# Patient Record
Sex: MALE | Race: WHITE | HISPANIC OR LATINO | Employment: OTHER | URBAN - METROPOLITAN AREA
[De-identification: names, ages, dates, MRNs, and addresses within clinical notes are randomized per-mention and may not be internally consistent; named-entity substitution may affect disease eponyms.]

---

## 2018-01-13 NOTE — RESULT NOTES
Message   Please inform patient that the biopsies confirmed infection with H  pylori  I will prescribe her antibiotics  I am still awaiting the results of his CAT scan  Please have him schedule a two-month follow-up, sooner if needed  Verified Results  (1) TISSUE EXAM [id number] 09:40AM Viola Hardin     Test Name Result Flag Reference   LAB AP CASE REPORT (Report)     Surgical Pathology Report             Case: [id number]                   Authorizing Provider: Mikayla Reeves MD      Collected:      02/29/2016 0940        Ordering Location:   [facility]  Received:      02/29/2016 [address]                                     Pathologist:      Gordon Duckworth DO                               Specimen:  Stomach, gastric body biopsy r/o gastritis   LAB AP FINAL DIAGNOSIS      A  Stomach, body, biopsy:  - 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, moderate  - Many H  pylori organisms identified on H&E and immunohistochemical   stains  LAB AP NOTE      Appropriate positive and negative controls obtained  LAB AP SURGICAL ADDITIONAL INFORMATION (Report)     These tests were developed and their performance characteristics   determined by [address] [place] [address] or [employer]  They may not be cleared or approved by the [place]  Food and   Drug Administration  The FDA has determined that such clearance or   approval is not necessary  These tests are used for clinical purposes  They should not be regarded as investigational or for research  This   laboratory has been approved by CLIA 88, designated as a high-complexity   laboratory and is qualified to perform these tests  LAB AP GROSS DESCRIPTION (Report)     A  The specimen is received in formalin, labeled with the patient's name   and hospital number, and is designated gastric body biopsy   The   specimen consists of multiple tan-pink soft tissue fragments measuring in   aggregate 0 8 by 0 8 by 0 2 centimeters  Entirely submitted  One cassette  Note: The estimated total formalin fixation time based upon information   provided by the submitting clinician and the standard processing schedule   is 18 75 hours  MAS       Discussion/Summary   Biopsies from your recent upper endoscopy showed infection with H  pylori bacteria  This may explain your symptoms  Please take antibiotics as prescribed  Please complete the CAT scan as requested       Signatures   Electronically signed by : JAVIER Arnold ; Mar  6 2016 10:29PM EST                       (Author)

## 2018-01-15 NOTE — CONSULTS
Assessment    1  Abdominal pain, acute, left upper quadrant (789 02,338 19) (R10 12)    Plan  Abdominal pain, acute, left upper quadrant    · Omeprazole 40 MG Oral Capsule Delayed Release; TAKE 1 CAPSULE DAILY   Rx By: Kiki Goldsmith; Dispense: 30 Days ; #:1 Capsule Delayed Release; Refill: 11; For: Abdominal pain, acute, left upper quadrant; AMBIKA = N; Verified Transmission to [facility] [facility]; Last Updated By: [employer]; 2/2/2016 2:08:39 PM   · EGD; Status:Hold For - Scheduling; Requested for:02Feb2016;    Perform:[facility]; TVV:[id number];OSUOMUC; For:Abdominal pain, acute, left upper quadrant; Ordered By:Nate Sanders;   · US ABDOMEN LIMITED; Status:Hold For - Scheduling; Requested for:02Feb2016;    Perform:[facility]; PII:[id number];UREENZZ; For:Abdominal pain, acute, left upper quadrant; Ordered By:Nate Sanders;    Discussion/Summary  Discussion Summary: This is a pleasant 65-year-old gentleman presenting with increasing episodes of abdominal pain, bloating, night sweats  #1 left upper quadrant abdominal pain/bloating: Possibly due to IBS in the setting of his stressors, versus peptic ulcer disease versus inflammatory bowel disease   -We'll proceed with an upper endoscopy to evaluate further  -Discuss with him the risks of the procedure including bleeding, surgery, perforation  -We'll prescribe him daily PPI therapy    #2 history of pancreatitis: In light of his current symptoms we will evaluate for any biliary etiology with a right upper quadrant ultrasound  Would consider further evaluation with laboratory studies and CAT scan imaging if he persists with such symptoms        Chief Complaint  Chief Complaint Free Text Note Form: Evaluation of abdominal pain      History of Present Illness  HPI: This is a 65-year-old gentleman who reports he has a remote history of pancreatitis, and recently has had increasing episodes of epigastric and left upper quadrant pain which radiates around his back  He was admitted approximately 4 years ago for similar episode, though no further workup was noted at that time  The patient denies any alcohol  He denies any tobacco, he works in [address]  He notes that he has had some postprandial abdominal pain and discomfort, but he denies any nausea, vomiting, change in appetite, change in weight, diarrhea, constipation  His most significant symptoms include daily bloating and more recently he's had frequent night sweats as well  He attributes a lot of his symptoms to his stressors related to his ex-wife  The patient denies significant NSAIDs on a regular basis  He denies any alcohol  Denies any prior endoscopic evaluation  Review of Systems  Complete-Male GI Adult: Other Symptoms: The remainder of the ten ROS was negative  ROS Reviewed:   ROS reviewed  Active Problems    1  Need for Tdap vaccination (V06 1) (Z23)   2  Nicotine dependence (305 1) (F17 200)   3  Night sweats (780 8) (R61)   4  Preop examination (V72 84) (Z01 818)   5  Rib pain on right side (786 50) (R07 81)   6  Tendonitis (726 90) (M77 9)   7  Thyroid disorder screen (V77 0) (Z13 29)    Past Medical History    1  History of Cough (786 2) (R05)   2  History of Generalized abdominal pain (789 07) (R10 84)   3  History of acute pharyngitis (V12 69) (Z87 09)   4  History of acute sinusitis (V12 69) (Z87 09)   5  History of herpes simplex infection (V12 09) (Z86 19)   6  History of Sprain Of The Back (847 9)  Active Problems And Past Medical History Reviewed: The active problems and past medical history were reviewed and updated today  Surgical History    1  History of Appendectomy  Surgical History Reviewed: The surgical history was reviewed and updated today  Family History    1  Denied: Family history of Crohn's disease   2  Denied: Family history of liver disease   3   Denied: Family history of Malignant neoplasm of colon, unspecified part of colon  Family History Reviewed: The family history was reviewed and updated today  Social History    · Denied: History of Alcohol use   · Current Every Day Smoker (305 1)   · Never a smoker  Social History Reviewed: The social history was reviewed and updated today  Current Meds   1  Ibuprofen 600 MG Oral Tablet; TAKE 1 TABLET 3 TIMES DAILY WITH FOOD AS NEEDED; Therapy: [id number] to (Evaluate:14Mar2015); Last Rx:[id number] Ordered   2  Naproxen 500 MG Oral Tablet; TAKE 1 TABLET EVERY 12 HOURS WITH FOOD; Therapy: 83UHS6405 to (Evaluate:12Mar2014); Last Rx:[id number] Ordered   3  PredniSONE 20 MG Oral Tablet; TAKE 1 TABLET TWICE DAILY; Therapy: 86IQS7104 to (Celien Estrella)  Requested for: 14TKF7887; Last   Rx:[id number] Ordered  Medication List Reviewed: The medication list was reviewed and updated today  Allergies    1  No Known Drug Allergies    2  No Known Latex Allergies    Vitals  Vital Signs [Data Includes: Current Encounter]    Recorded: 04WXB8829 01:49PM   Heart Rate 78, R Radial   Pulse Quality Normal, R Radial   Respiration 18   Respiration Quality Normal   Systolic 495, RUE, Sitting   Diastolic 70, RUE, Sitting   Height 6 ft 2 in   Weight 215 lb 0 6 oz   BMI Calculated 27 61   BSA Calculated 2 24   O2 Saturation 98     Physical Exam   Gen  : Well-nourished well-developed, no acute distress  HEENT: Anicteric, moist mucous membranes, no cervical or supraclavicular lymphadenopathy  Cardiovascular: Regular rate and rhythm, no murmurs rubs or gallops  Pulmonary: Clear to auscultation bilaterally  Abdomen: Soft, mild left upper quadrant tenderness, no rebound, no guarding, nondistended  No hepatosplenomegaly  Bowel sounds present and regular  Extremities: No cyanosis, clubbing, or edema,  Skin: No rashes  Neuro: Alert, awake, oriented x3         Future Appointments    Date/Time Provider Specialty Site   03/07/2016 12:45 PM JAVIER Tolbert   Gastroenterology Adult [facility] [id number] Signatures   Electronically signed by : JAVIER Aquino ; Feb 2 2016  9:59PM EST                       (Author)

## 2018-11-21 ENCOUNTER — HOSPITAL ENCOUNTER (EMERGENCY)
Facility: HOSPITAL | Age: 48
Discharge: HOME | End: 2018-11-21
Attending: EMERGENCY MEDICINE | Admitting: EMERGENCY MEDICINE
Payer: COMMERCIAL

## 2018-11-21 VITALS
WEIGHT: 220 LBS | SYSTOLIC BLOOD PRESSURE: 136 MMHG | DIASTOLIC BLOOD PRESSURE: 76 MMHG | OXYGEN SATURATION: 97 % | BODY MASS INDEX: 28.25 KG/M2 | TEMPERATURE: 98.4 F | HEART RATE: 82 BPM | RESPIRATION RATE: 16 BRPM

## 2018-11-21 DIAGNOSIS — S61.419A HAND LACERATION: Primary | ICD-10-CM

## 2018-11-21 PROCEDURE — 99282 EMERGENCY DEPT VISIT SF MDM: CPT

## 2018-11-21 PROCEDURE — 90471 IMMUNIZATION ADMIN: CPT

## 2018-11-21 PROCEDURE — 90715 TDAP VACCINE 7 YRS/> IM: CPT | Performed by: PHYSICIAN ASSISTANT

## 2018-11-21 RX ORDER — GINSENG 100 MG
1 CAPSULE ORAL ONCE
Status: COMPLETED | OUTPATIENT
Start: 2018-11-21 | End: 2018-11-21

## 2018-11-21 RX ADMIN — TETANUS TOXOID, REDUCED DIPHTHERIA TOXOID AND ACELLULAR PERTUSSIS VACCINE, ADSORBED 0.5 ML: 5; 2.5; 8; 8; 2.5 SUSPENSION INTRAMUSCULAR at 15:05

## 2018-11-21 RX ADMIN — BACITRACIN ZINC 1 SMALL APPLICATION: 500 OINTMENT TOPICAL at 15:05

## 2018-11-21 NOTE — ED PROVIDER NOTES
History  Chief Complaint   Patient presents with    Laceration     has laceration on left hand thumb side     50-year-old male presents with left hand laceration x30 minutes  The laceration is 1 cm and superficial  He was working on a home project when he cut his hand with a knife  It is not bleeding now  He denies any numbness or tingling  He still has full range of motion of his fingers  He is due for his next tetanus shot this year  He denies any wrist pain  No swelling, bruising, or redness  Prior to Admission Medications   Prescriptions Last Dose Informant Patient Reported? Taking?   omeprazole (PriLOSEC) 40 MG capsule Not Taking at Unknown time Self Yes No   Sig: Take 40 mg by mouth daily Indications: Gastroesophageal Reflux Disease  Facility-Administered Medications: None       Past Medical History:   Diagnosis Date    Pancreatitis 2012    Hospitalized for Pancreatitis    Reflux gastritis        Past Surgical History:   Procedure Laterality Date    APPENDECTOMY      ESOPHAGOGASTRODUODENOSCOPY N/A 2/29/2016    Procedure: ESOPHAGOGASTRODUODENOSCOPY (EGD); Surgeon: Daniel Sims MD;  Location: [facility]; Service:        History reviewed  No pertinent family history  I have reviewed and agree with the history as documented  Social History   Substance Use Topics    Smoking status: Never Smoker    Smokeless tobacco: Never Used    Alcohol use No      Comment: quit 20 yrs  ago        Review of Systems   Constitutional: Negative for chills and fever  HENT: Negative for sneezing and sore throat  Respiratory: Negative for cough and shortness of breath  Cardiovascular: Negative for chest pain, palpitations and leg swelling  Gastrointestinal: Negative for abdominal pain, constipation, diarrhea and vomiting  Musculoskeletal: Negative for back pain, gait problem, joint swelling and myalgias  Skin: Positive for wound  Negative for color change, pallor and rash  Neurological: Negative for dizziness, syncope, weakness, light-headedness, numbness and headaches  All other systems reviewed and are negative  Physical Exam  Physical Exam   Constitutional: He appears well-developed and well-nourished  No distress  HENT:   Head: Normocephalic and atraumatic  Nose: Nose normal    Eyes: EOM are normal    Neck: Normal range of motion  Neck supple  Cardiovascular: Normal rate, regular rhythm, normal heart sounds and intact distal pulses  Exam reveals no gallop and no friction rub  No murmur heard  Pulmonary/Chest: Effort normal and breath sounds normal  No respiratory distress  He has no wheezes  He has no rales  Sp02 is 97% indicating adequate oxygenation on room air   Musculoskeletal:        Hands:  Skin: Skin is warm and dry  Capillary refill takes less than 2 seconds  No rash noted  He is not diaphoretic  No erythema  No pallor  Nursing note and vitals reviewed  Vital Signs  ED Triage Vitals [11/21/18 1451]   Temperature Pulse Respirations Blood Pressure SpO2   98 4 °F (36 9 °C) 82 16 136/76 97 %      Temp Source Heart Rate Source Patient Position - Orthostatic VS BP Location FiO2 (%)   Tympanic Monitor Sitting Right arm --      Pain Score       2           Vitals:    11/21/18 1451 11/21/18 1500   BP: 136/76 136/76   Pulse: 82    Patient Position - Orthostatic VS: Sitting        Visual Acuity      ED Medications  Medications   bacitracin topical ointment 1 small application (1 small application Topical Given 11/21/18 1505)   tetanus-diphtheria-acellular pertussis (BOOSTRIX) IM injection 0 5 mL (0 5 mL Intramuscular Given 11/21/18 1505)       Diagnostic Studies  Results Reviewed     None                 No orders to display              Procedures  Lac Repair  Date/Time: 11/21/2018 3:20 PM  Performed by: Tamir Ziegler by: [facility]   Consent: Verbal consent obtained    Risks and benefits: risks, benefits and alternatives were discussed  Consent given by: patient  Patient understanding: patient states understanding of the procedure being performed  Patient consent: the patient's understanding of the procedure matches consent given  Procedure consent: procedure consent matches procedure scheduled  Relevant documents: relevant documents present and verified  Test results: test results available and properly labeled  Site marked: the operative site was marked  Radiology Images displayed and confirmed  If images not available, report reviewed: imaging studies available  Required items: required blood products, implants, devices, and special equipment available  Patient identity confirmed: verbally with patient  Time out: Immediately prior to procedure a "time out" was called to verify the correct patient, procedure, equipment, support staff and site/side marked as required  Body area: upper extremity  Location details: left hand  Laceration length: 1 cm  Foreign bodies: no foreign bodies  Tendon involvement: none  Nerve involvement: none  Vascular damage: no  Anesthesia: local infiltration    Anesthesia:  Local Anesthetic: lidocaine 1% with epinephrine  Anesthetic total: 2 mL    Wound Dehiscence:  Superficial Wound Dehiscence: simple closure      Procedure Details:  Preparation: Patient was prepped and draped in the usual sterile fashion    Irrigation solution: saline  Irrigation method: syringe  Amount of cleaning: standard  Debridement: none  Degree of undermining: none  Skin closure: Ethilon (4-0)  Number of sutures: 5  Technique: simple  Approximation: close  Approximation difficulty: simple  Dressing: antibiotic ointment, gauze roll and pressure dressing  Patient tolerance: Patient tolerated the procedure well with no immediate complications             Phone Contacts  ED Phone Contact    ED Course                               MDM  Number of Diagnoses or Management Options  Hand laceration:   Diagnosis management comments: Updated patient's tetanus vaccine  Patient tolerated suture placement without complication  Continue to apply bacitracin to site  Return to ED, urgent care, or pcp in 7-10 days for suture removal  Gave patient proper education regarding diagnosis  Answered all questions  Return to ED for any worsening of symptoms otherwise follow up with primary care physician for re-evaluation  Discussed plan with patient who verbalized understanding and agreed to plan  Amount and/or Complexity of Data Reviewed  Discuss the patient with other providers: yes      CritCare Time    Disposition  Final diagnoses:   Hand laceration     Time reflects when diagnosis was documented in both MDM as applicable and the Disposition within this note     Time User Action Codes Description Comment    11/21/2018  3:34 PM So Sheldon Add [D21 263T] Hand laceration       ED Disposition     ED Disposition Condition Comment    Discharge  Megan Melania discharge to home/self care  Condition at discharge: Good        Follow-up Information     Follow up With Specialties Details Why Contact Info Additional Information    Antione Mcmullen MD Family Medicine Schedule an appointment as soon as possible for a visit in 1 week For suture removal in 7-10 days [address] [facility] Emergency Department Emergency Medicine Go in 1 week For suture removal in 7-10 days [address] [facility], [facility], [address]          Discharge Medication List as of 11/21/2018  3:34 PM      CONTINUE these medications which have NOT CHANGED    Details   omeprazole (PriLOSEC) 40 MG capsule Take 40 mg by mouth daily Indications: Gastroesophageal Reflux Disease , Until Discontinued, Historical Med           No discharge procedures on file      ED Provider  Electronically Signed by           Mildred Hernandez PA-C  11/21/18 4942

## 2018-11-21 NOTE — DISCHARGE INSTRUCTIONS
Laceration   WHAT YOU NEED TO KNOW:   A laceration is an injury to the skin and the soft tissue underneath it  Lacerations happen when you are cut or hit by something  They can happen anywhere on the body  DISCHARGE INSTRUCTIONS:   Return to the emergency department if:   · You have heavy bleeding or bleeding that does not stop after 10 minutes of holding firm, direct pressure over the wound  · Your wound opens up  Contact your healthcare provider if:   · You have a fever or chills  · Your laceration is red, warm, or swollen  · You have red streaks on your skin coming from your wound  · You have white or yellow drainage from the wound that smells bad  · You have pain that gets worse, even after treatment  · You have questions or concerns about your condition or care  Medicines:   · Prescription pain medicine  may be given  Ask how to take this medicine safely  · Antibiotics  help treat or prevent a bacterial infection  · Take your medicine as directed  Contact your healthcare provider if you think your medicine is not helping or if you have side effects  Tell him or her if you are allergic to any medicine  Keep a list of the medicines, vitamins, and herbs you take  Include the amounts, and when and why you take them  Bring the list or the pill bottles to follow-up visits  Carry your medicine list with you in case of an emergency  Care for your wound as directed:   · Do not get your wound wet  until your healthcare provider says it is okay  Do not soak your wound in water  Do not go swimming until your healthcare provider says it is okay  Carefully wash the wound with soap and water  Gently pat the area dry or allow it to air dry  · Change your bandages  when they get wet, dirty, or after washing  Apply new, clean bandages as directed  Do not apply elastic bandages or tape too tight  Do not put powders or lotions over your incision  · Apply antibiotic ointment as directed  Your healthcare provider may give you antibiotic ointment to put over your wound if you have stitches  If you have strips of tape over your incision, let them dry up and fall off on their own  If they do not fall off within 14 days, gently remove them  If you have glue over your wound, do not remove or pick at it  If your glue comes off, do not replace it with glue that you have at home  · Check your wound every day for signs of infection such as swelling, redness, or pus  Self-care:   · Apply ice  on your wound for 15 to 20 minutes every hour or as directed  Use an ice pack, or put crushed ice in a plastic bag  Cover it with a towel  Ice helps prevent tissue damage and decreases swelling and pain  · Use a splint as directed  A splint will decrease movement and stress on your wound  It may help it heal faster  A splint may be used for lacerations over joints or areas of your body that bend  Ask your healthcare provider how to apply and remove a splint  · Decrease scarring of your wound  by applying ointments as directed  Do not apply ointments until your healthcare provider says it is okay  You may need to wait until your wound is healed  Ask which ointment to buy and how often to use it  After your wound is healed, use sunscreen over the area when you are out in the sun  You should do this for at least 6 months to 1 year after your injury  Follow up with your healthcare provider as directed: You may need to follow up in 24 to 48 hours to have your wound checked for infection  You will need to return in 3 to 14 days if you have stitches or staples so they can be removed  Care for your wound as directed to prevent infection and help it heal  Write down your questions so you remember to ask them during your visits  © 2017 [employer] Information is for End User's use only and may not be sold, redistributed or otherwise used for commercial purposes   All illustrations and images included in [facility] are the copyrighted property of [employer]  or Alvaro Sanchez  The above information is an  only  It is not intended as medical advice for individual conditions or treatments  Talk to your doctor, nurse or pharmacist before following any medical regimen to see if it is safe and effective for you

## 2019-07-07 ENCOUNTER — OFFICE VISIT (OUTPATIENT)
Dept: URGENT CARE | Facility: CLINIC | Age: 49
End: 2019-07-07
Payer: COMMERCIAL

## 2019-07-07 VITALS
HEIGHT: 74 IN | SYSTOLIC BLOOD PRESSURE: 135 MMHG | HEART RATE: 68 BPM | TEMPERATURE: 98.8 F | DIASTOLIC BLOOD PRESSURE: 70 MMHG | WEIGHT: 228 LBS | BODY MASS INDEX: 29.26 KG/M2 | RESPIRATION RATE: 16 BRPM | OXYGEN SATURATION: 98 %

## 2019-07-07 DIAGNOSIS — H60.502 ACUTE OTITIS EXTERNA OF LEFT EAR, UNSPECIFIED TYPE: Primary | ICD-10-CM

## 2019-07-07 PROCEDURE — 99213 OFFICE O/P EST LOW 20 MIN: CPT | Performed by: NURSE PRACTITIONER

## 2019-07-07 RX ORDER — OFLOXACIN 3 MG/ML
10 SOLUTION AURICULAR (OTIC) DAILY
Qty: 5 ML | Refills: 0 | Status: SHIPPED | OUTPATIENT
Start: 2019-07-07 | End: 2019-07-14

## 2019-07-07 NOTE — PROGRESS NOTES
[facility]        NAME: Jeffrey Freitas is a 51 y o  male  : 1970    MRN: [id number]  DATE: 2019  TIME: 1:12 PM    Assessment and Plan   Acute otitis externa of left ear, unspecified type [H60 502]  1  Acute otitis externa of left ear, unspecified type  ofloxacin (FLOXIN) 0 3 % otic solution         Patient Instructions     Otitis Externa   WHAT YOU NEED TO KNOW:   Otitis externa, or swimmer's ear, is an infection in the outer ear canal  This canal goes from the outside of the ear to the eardrum  DISCHARGE INSTRUCTIONS:   Return to the emergency department if:   · You have severe ear pain  · You are suddenly unable to hear at all  · You have new swelling in your face, behind your ears, or in your neck  · You suddenly cannot move part of your face  · Your face suddenly feels numb  Contact your healthcare provider if:   · You have a fever  · Your signs and symptoms do not get better after 2 days of treatment  · Your signs and symptoms go away for a time, but then come back  · You have questions or concerns about your condition or care  Medicines:   · NSAIDs , such as ibuprofen, help decrease swelling, pain, and fever  This medicine is available with or without a doctor's order  NSAIDs can cause stomach bleeding or kidney problems in certain people  If you take blood thinner medicine, always ask if NSAIDs are safe for you  Always read the medicine label and follow directions  Do not give these medicines to children under 10months of age without direction from your child's healthcare provider  · Acetaminophen  decreases pain and fever  It is available without a doctor's order  Ask how much to take and how often to take it  Follow directions  Acetaminophen can cause liver damage if not taken correctly  · Ear drops  that contain an antibiotic may be given  The antibiotic helps treat a bacterial infection  You may also be given steroid medicine   The steroid helps decrease redness, swelling, and pain  · Take your medicine as directed  Contact your healthcare provider if you think your medicine is not helping or if you have side effects  Tell him or her if you are allergic to any medicine  Keep a list of the medicines, vitamins, and herbs you take  Include the amounts, and when and why you take them  Bring the list or the pill bottles to follow-up visits  Carry your medicine list with you in case of an emergency  Follow up with your healthcare provider as directed:  Write down your questions so you remember to ask them during your visits  How to use eardrops:   · Lie down on your side with your infected ear facing up  · Carefully drip the correct number of eardrops into your ear  Have another person help you if possible  · Gently move the outside part of your ear back and forth to help the medicine reach your ear canal      · Stay lying down in the same position (with your ear facing up) for 3 to 5 minutes  Prevent otitis externa:   · Do not put cotton swabs or foreign objects in your ears  · Wrap a clean moist washcloth around your finger, and use it to clean your outer ear and remove extra ear wax  · Use ear plugs when you swim  Dry your outer ears completely after you swim or bathe  © 2017 [employer]  Information is for End User's use only and may not be sold, redistributed or otherwise used for commercial purposes  All illustrations and images included in CareNotes® are the copyrighted property of [employer]  or [employer]  The above information is an  only  It is not intended as medical advice for individual conditions or treatments  Talk to your doctor, nurse or pharmacist before following any medical regimen to see if it is safe and effective for you          Chief Complaint     Chief Complaint   Patient presents with    Ear Fullness     Experiencing pressure/ drainage in L ear 2 days, slowly starting to feel pressure in R ear today  History of Present Illness       52 y/o male presents for left ear pressure/pain and bloody drainage  He states he recently returned from [facility]  He was swimming in the pool and ocean frequently  He also states he got a spa treatment that involved different types of heated and cooled water treatments  He is unsure if the ear is related to the spa, but feels that it could have caused the pain and drainage  He denies and change in hearing, headache, sore throat, congestion, N/V, or cough  The pain is localized to the ear, it does not extend into the neck  He denies using q-tips or any foreign bodies in th ear  He has not tried any OTC tylenol/ibuprofen for the pain  Review of Systems   Review of Systems   Constitutional: Negative for chills and fever  HENT: Positive for ear discharge and ear pain  Respiratory: Negative for cough  Cardiovascular: Negative for chest pain and palpitations  Gastrointestinal: Negative for nausea and vomiting  Current Medications       Current Outpatient Medications:     ofloxacin (FLOXIN) 0 3 % otic solution, Administer 10 drops into the left ear daily for 7 days, Disp: 5 mL, Rfl: 0    Current Allergies     Allergies as of 07/07/2019    (No Known Allergies)            The following portions of the patient's history were reviewed and updated as appropriate: allergies, current medications, past family history, past medical history, past social history, past surgical history and problem list      Past Medical History:   Diagnosis Date    Pancreatitis 2012    Hospitalized for Pancreatitis    Reflux gastritis        Past Surgical History:   Procedure Laterality Date    APPENDECTOMY      ESOPHAGOGASTRODUODENOSCOPY N/A 2/29/2016    Procedure: ESOPHAGOGASTRODUODENOSCOPY (EGD); Surgeon: Homero Denson MD;  Location: [facility]; Service:        History reviewed  No pertinent family history        Medications have been verified  Objective   /70 (BP Location: Left arm, Patient Position: Sitting, Cuff Size: Standard)   Pulse 68   Temp 98 8 °F (37 1 °C) (Tympanic)   Resp 16   Ht 6' 2" (1 88 m)   Wt 103 kg (228 lb)   SpO2 98%   BMI 29 27 kg/m²        Physical Exam     Physical Exam   Constitutional: Vital signs are normal  He appears well-developed and well-nourished  No distress  HENT:   Right Ear: Tympanic membrane, external ear and ear canal normal    Left Ear: Tympanic membrane and external ear normal  There is drainage, swelling and tenderness  The left ear canal is tender during examination, there is purulent drainage present  At the 6 o'clock position there is an excoriation with scant amount of bloody drainage  TM is intact  There is no pain with palpation to the external ear  Cardiovascular: Normal rate, regular rhythm and normal heart sounds     Pulmonary/Chest: Effort normal and breath sounds normal

## 2019-07-07 NOTE — PATIENT INSTRUCTIONS
Otitis Externa   WHAT YOU NEED TO KNOW:   Otitis externa, or swimmer's ear, is an infection in the outer ear canal  This canal goes from the outside of the ear to the eardrum  DISCHARGE INSTRUCTIONS:   Return to the emergency department if:   · You have severe ear pain  · You are suddenly unable to hear at all  · You have new swelling in your face, behind your ears, or in your neck  · You suddenly cannot move part of your face  · Your face suddenly feels numb  Contact your healthcare provider if:   · You have a fever  · Your signs and symptoms do not get better after 2 days of treatment  · Your signs and symptoms go away for a time, but then come back  · You have questions or concerns about your condition or care  Medicines:   · NSAIDs , such as ibuprofen, help decrease swelling, pain, and fever  This medicine is available with or without a doctor's order  NSAIDs can cause stomach bleeding or kidney problems in certain people  If you take blood thinner medicine, always ask if NSAIDs are safe for you  Always read the medicine label and follow directions  Do not give these medicines to children under 10months of age without direction from your child's healthcare provider  · Acetaminophen  decreases pain and fever  It is available without a doctor's order  Ask how much to take and how often to take it  Follow directions  Acetaminophen can cause liver damage if not taken correctly  · Ear drops  that contain an antibiotic may be given  The antibiotic helps treat a bacterial infection  You may also be given steroid medicine  The steroid helps decrease redness, swelling, and pain  · Take your medicine as directed  Contact your healthcare provider if you think your medicine is not helping or if you have side effects  Tell him or her if you are allergic to any medicine  Keep a list of the medicines, vitamins, and herbs you take  Include the amounts, and when and why you take them  Bring the list or the pill bottles to follow-up visits  Carry your medicine list with you in case of an emergency  Follow up with your healthcare provider as directed:  Write down your questions so you remember to ask them during your visits  How to use eardrops:   · Lie down on your side with your infected ear facing up  · Carefully drip the correct number of eardrops into your ear  Have another person help you if possible  · Gently move the outside part of your ear back and forth to help the medicine reach your ear canal      · Stay lying down in the same position (with your ear facing up) for 3 to 5 minutes  Prevent otitis externa:   · Do not put cotton swabs or foreign objects in your ears  · Wrap a clean moist washcloth around your finger, and use it to clean your outer ear and remove extra ear wax  · Use ear plugs when you swim  Dry your outer ears completely after you swim or bathe  © 2017 [employer]  Information is for End User's use only and may not be sold, redistributed or otherwise used for commercial purposes  All illustrations and images included in CareNotes® are the copyrighted property of [employer]  or [employer]  The above information is an  only  It is not intended as medical advice for individual conditions or treatments  Talk to your doctor, nurse or pharmacist before following any medical regimen to see if it is safe and effective for you

## 2020-01-07 ENCOUNTER — OFFICE VISIT (OUTPATIENT)
Dept: URGENT CARE | Facility: CLINIC | Age: 50
End: 2020-01-07
Payer: COMMERCIAL

## 2020-01-07 VITALS
BODY MASS INDEX: 29.13 KG/M2 | DIASTOLIC BLOOD PRESSURE: 76 MMHG | WEIGHT: 227 LBS | OXYGEN SATURATION: 97 % | TEMPERATURE: 98.4 F | HEIGHT: 74 IN | RESPIRATION RATE: 20 BRPM | HEART RATE: 79 BPM | SYSTOLIC BLOOD PRESSURE: 127 MMHG

## 2020-01-07 DIAGNOSIS — J01.10 ACUTE NON-RECURRENT FRONTAL SINUSITIS: ICD-10-CM

## 2020-01-07 DIAGNOSIS — J20.9 ACUTE BRONCHITIS, UNSPECIFIED ORGANISM: Primary | ICD-10-CM

## 2020-01-07 PROCEDURE — 99213 OFFICE O/P EST LOW 20 MIN: CPT | Performed by: PHYSICIAN ASSISTANT

## 2020-01-07 RX ORDER — METHYLPREDNISOLONE 4 MG/1
TABLET ORAL
Qty: 1 EACH | Refills: 0 | Status: SHIPPED | OUTPATIENT
Start: 2020-01-07 | End: 2021-03-02

## 2020-01-07 RX ORDER — AMOXICILLIN AND CLAVULANATE POTASSIUM 875; 125 MG/1; MG/1
1 TABLET, FILM COATED ORAL EVERY 12 HOURS SCHEDULED
Qty: 14 TABLET | Refills: 0 | Status: SHIPPED | OUTPATIENT
Start: 2020-01-07 | End: 2020-01-14

## 2020-01-07 NOTE — PROGRESS NOTES
[facility]        NAME: Ramon Costello is a 47 y o  male  : 1970    MRN: [id number]  DATE: 2020  TIME: 2:41 PM    Assessment and Plan   Acute bronchitis, unspecified organism [J20 9]  1  Acute bronchitis, unspecified organism  methylPREDNISolone 4 MG tablet therapy pack   2  Acute non-recurrent frontal sinusitis  amoxicillin-clavulanate (AUGMENTIN) 875-125 mg per tablet     Patient Instructions   Take the antibiotic as directed with food and water  Take a probiotic while taking this medication  Continue Mucinex DM  Take the steroid as directed with food and water  While on this medication do not take any NSAIDs including ibuprofen (Advil), naproxen (Aleve), etc   Avoid caffeinated beverages while taking this medication  Saltwater gargles, warm tea with honey, throat lozenges, and steam showers may help to reduce coughing fits  Use a cool mist humidifier at bedtime, turning on hours prior to bed with your bedroom doors shut for maximum relief  Follow up with your family doctor in 3-5 days if symptoms persist   Proceed to the ER if symptoms worsen  Discussed with patient that symptoms appear consistent with sinusitis and acute bronchitis  Will treat for inflammation of bronchitis with a Medrol Dosepak  Encouraged conservative therapies to reduce cough but notified that this cough can linger even after resolution of other symptoms  Encouraged follow-up if symptoms persist or worsen  He was agreeable  All questions answered  Precautions given  Chief Complaint     Chief Complaint   Patient presents with    Cough     cough, sore throat and night sweats for 1 week   Sore Throat     History of Present Illness       26-year-old male presenting with c/o cough x 10 days  Reports a primarily dry cough with intermittent production after taking Mucinex  Cough is worse at night  He reports some chest tightness that relieves with cough  No SOB or wheezing  Notes NC but no RN or PND  Over past 3 days has developed pain behind the eyes  No fevers, chills, sweats, N/V/D  He is treating with Mucinex DM, Lime, and honey with some relief  No sick contacts or recent travel  Nonsmoker  No flu shot  Review of Systems   Review of Systems   HENT: Positive for congestion, sinus pain and sore throat  Negative for ear pain and rhinorrhea  Respiratory: Positive for cough and chest tightness  Negative for shortness of breath and wheezing  Gastrointestinal: Negative for abdominal pain, diarrhea, nausea and vomiting  Neurological: Positive for light-headedness and headaches  Current Medications       Current Outpatient Medications:     amoxicillin-clavulanate (AUGMENTIN) 875-125 mg per tablet, Take 1 tablet by mouth every 12 (twelve) hours for 7 days, Disp: 14 tablet, Rfl: 0    methylPREDNISolone 4 MG tablet therapy pack, Use as directed on package, Disp: 1 each, Rfl: 0    Current Allergies     Allergies as of 01/07/2020    (No Known Allergies)            The following portions of the patient's history were reviewed and updated as appropriate: allergies, current medications, past family history, past medical history, past social history, past surgical history and problem list      Past Medical History:   Diagnosis Date    Pancreatitis 2012    Hospitalized for Pancreatitis    Reflux gastritis        Past Surgical History:   Procedure Laterality Date    APPENDECTOMY      ESOPHAGOGASTRODUODENOSCOPY N/A 2/29/2016    Procedure: ESOPHAGOGASTRODUODENOSCOPY (EGD); Surgeon: Micah Gray MD;  Location: [facility]; Service:        History reviewed  No pertinent family history  Medications have been verified  Objective   /76   Pulse 79   Temp 98 4 °F (36 9 °C)   Resp 20   Ht 6' 2" (1 88 m)   Wt 103 kg (227 lb)   SpO2 97%   BMI 29 15 kg/m²        Physical Exam     Physical Exam   Constitutional: Vital signs are normal  He appears well-developed and well-nourished   He is cooperative  He does not appear ill  No distress  HENT:   Head: Normocephalic and atraumatic  Right Ear: Hearing, tympanic membrane, external ear and ear canal normal    Left Ear: Hearing, tympanic membrane, external ear and ear canal normal    Nose: Mucosal edema and rhinorrhea present  Mouth/Throat: Uvula is midline, oropharynx is clear and moist and mucous membranes are normal  Mucous membranes are not pale, not dry and not cyanotic  No oral lesions  No uvula swelling  No oropharyngeal exudate, posterior oropharyngeal edema, posterior oropharyngeal erythema or tonsillar abscesses  Eyes: Conjunctivae and lids are normal  Right eye exhibits no discharge and no exudate  Left eye exhibits no discharge and no exudate  Neck: Trachea normal and phonation normal  Neck supple  No tracheal tenderness present  No neck rigidity  No edema and no erythema present  Cardiovascular: Normal rate, regular rhythm and normal heart sounds  Exam reveals no distant heart sounds  Pulmonary/Chest: Effort normal and breath sounds normal  No stridor  No respiratory distress  He has no decreased breath sounds  He has no wheezes  He has no rhonchi  He has no rales  Lymphadenopathy:     He has no cervical adenopathy  Neurological: He is alert  He is not disoriented  No cranial nerve deficit  Coordination and gait normal    Skin: Skin is warm, dry and intact  No rash noted  He is not diaphoretic  No erythema  No pallor  Psychiatric: He has a normal mood and affect  His behavior is normal  Judgment and thought content normal    Nursing note and vitals reviewed

## 2020-01-07 NOTE — PATIENT INSTRUCTIONS
Take the antibiotic as directed with food and water  Take a probiotic while taking this medication  Continue Mucinex DM  Take the steroid as directed with food and water  While on this medication do not take any NSAIDs including ibuprofen (Advil), naproxen (Aleve), etc   Avoid caffeinated beverages while taking this medication  Saltwater gargles, warm tea with honey, throat lozenges, and steam showers may help to reduce coughing fits  Use a cool mist humidifier at bedtime, turning on hours prior to bed with your bedroom doors shut for maximum relief  Follow up with your family doctor in 3-5 days if symptoms persist   Proceed to the ER if symptoms worsen  Acute Bronchitis   WHAT YOU NEED TO KNOW:   Acute bronchitis is swelling and irritation in the air passages of your lungs  This irritation may cause you to cough or have other breathing problems  Acute bronchitis often starts because of another illness, such as a cold or the flu  The illness spreads from your nose and throat to your windpipe and airways  Bronchitis is often called a chest cold  Acute bronchitis lasts about 3 to 6 weeks and is usually not a serious illness  Your cough can last for several weeks  DISCHARGE INSTRUCTIONS:   Return to the emergency department if:   · You cough up blood  · Your lips or fingernails turn blue  · You feel like you are not getting enough air when you breathe  Contact your healthcare provider if:   · You have a fever  · Your breathing problems do not go away or get worse  · Your cough does not get better within 4 weeks  · You have questions or concerns about your condition or care  Self-care:   · Get more rest   Rest helps your body to heal  Slowly start to do more each day  Rest when you feel it is needed  · Avoid irritants in the air  Avoid chemicals, fumes, and dust  Wear a face mask if you must work around dust or fumes  Stay inside on days when air pollution levels are high   If you have allergies, stay inside when pollen counts are high  Do not use aerosol products, such as spray-on deodorant, bug spray, and hair spray  · Do not smoke or be around others who smoke  Nicotine and other chemicals in cigarettes and cigars damages the cilia that move mucus out of your lungs  Ask your healthcare provider for information if you currently smoke and need help to quit  E-cigarettes or smokeless tobacco still contain nicotine  Talk to your healthcare provider before you use these products  · Drink liquids as directed  Liquids help keep your air passages moist and help you cough up mucus  You may need to drink more liquids when you have acute bronchitis  Ask how much liquid to drink each day and which liquids are best for you  · Use a humidifier or vaporizer  Use a cool mist humidifier or a vaporizer to increase air moisture in your home  This may make it easier for you to breathe and help decrease your cough  Decrease risk for acute bronchitis:   · Get the vaccinations you need  Ask your healthcare provider if you should get vaccinated against the flu or pneumonia  · Prevent the spread of germs  You can decrease your risk of acute bronchitis and other illnesses by doing the following:     [facility] your hands often with soap and water  Carry germ-killing hand lotion or gel with you  You can use the lotion or gel to clean your hands when soap and water are not available  ¨ Do not touch your eyes, nose, or mouth unless you have washed your hands first     ¨ Always cover your mouth when you cough to prevent the spread of germs  It is best to cough into a tissue or your shirt sleeve instead of into your hand  Ask those around you cover their mouths when they cough  ¨ Try to avoid people who have a cold or the flu  If you are sick, stay away from others as much as possible  Medicines:   Your healthcare provider may  give you any of the following:  · Ibuprofen or acetaminophen  are medicines that help lower your fever  They are available without a doctor's order  Ask your healthcare provider which medicine is right for you  Ask how much to take and how often to take it  Follow directions  These medicines can cause stomach bleeding if not taken correctly  Ibuprofen can cause kidney damage  Do not take ibuprofen if you have kidney disease, an ulcer, or allergies to aspirin  Acetaminophen can cause liver damage  Do not take more than 4,000 milligrams in 24 hours  · Decongestants  help loosen mucus in your lungs and make it easier to cough up  This can help you breathe easier  · Cough suppressants  decrease your urge to cough  If your cough produces mucus, do not take a cough suppressant unless your healthcare provider tells you to  Your healthcare provider may suggest that you take a cough suppressant at night so you can rest     · Inhalers  may be given  Your healthcare provider may give you one or more inhalers to help you breathe easier and cough less  An inhaler gives your medicine to open your airways  Ask your healthcare provider to show you how to use your inhaler correctly  · Take your medicine as directed  Contact your healthcare provider if you think your medicine is not helping or if you have side effects  Tell him of her if you are allergic to any medicine  Keep a list of the medicines, vitamins, and herbs you take  Include the amounts, and when and why you take them  Bring the list or the pill bottles to follow-up visits  Carry your medicine list with you in case of an emergency  Follow up with your healthcare provider as directed:  Write down questions you have so you will remember to ask them during your follow-up visits  © 2017 [employer] Information is for End User's use only and may not be sold, redistributed or otherwise used for commercial purposes   All illustrations and images included in CareNotes® are the copyrighted property of [employer]  or [employer]  The above information is an  only  It is not intended as medical advice for individual conditions or treatments  Talk to your doctor, nurse or pharmacist before following any medical regimen to see if it is safe and effective for you

## 2021-02-06 ENCOUNTER — OFFICE VISIT (OUTPATIENT)
Dept: URGENT CARE | Facility: CLINIC | Age: 51
End: 2021-02-06
Payer: COMMERCIAL

## 2021-02-06 VITALS
HEART RATE: 76 BPM | TEMPERATURE: 97.9 F | DIASTOLIC BLOOD PRESSURE: 72 MMHG | SYSTOLIC BLOOD PRESSURE: 116 MMHG | OXYGEN SATURATION: 100 % | BODY MASS INDEX: 29.53 KG/M2 | RESPIRATION RATE: 16 BRPM | WEIGHT: 230 LBS

## 2021-02-06 DIAGNOSIS — J06.9 ACUTE URI: Primary | ICD-10-CM

## 2021-02-06 PROCEDURE — 99213 OFFICE O/P EST LOW 20 MIN: CPT | Performed by: PHYSICIAN ASSISTANT

## 2021-02-06 RX ORDER — FLUTICASONE PROPIONATE 50 MCG
1 SPRAY, SUSPENSION (ML) NASAL DAILY
Qty: 1 BOTTLE | Refills: 0 | Status: SHIPPED | OUTPATIENT
Start: 2021-02-06 | End: 2021-03-02

## 2021-02-06 RX ORDER — BENZONATATE 100 MG/1
100 CAPSULE ORAL 3 TIMES DAILY PRN
Qty: 20 CAPSULE | Refills: 0 | Status: SHIPPED | OUTPATIENT
Start: 2021-02-06 | End: 2021-03-02

## 2021-02-06 NOTE — PROGRESS NOTES
[facility]        NAME: Ludy Lee is a 45 y o  male  : 1970    MRN: [id number]  DATE: 2021  TIME: 12:36 PM    Assessment and Plan   Acute URI [J06 9]  1  Acute URI  benzonatate (TESSALON PERLES) 100 mg capsule    fluticasone (FLONASE) 50 mcg/act nasal spray         Patient Instructions   Acute Upper Respiratory Tract Infection:   -There is no sign of bacterial infection on exam at this time  This is likely a viral illness  Advised supportive measures   -Fluticasone Nasal Spray for PND and congestion  Tessalon perles for the cough  -You can use OTC zyrtec or claritin  You can OTC mucinex  -Use a humidifier next to your bed  Take steam showers  -You can take Advil or Tylenol for fever or pain  -Vitamin C and Vitamin D daily  -Stay very well hydrated and rest   -If your symptoms persist or worsen follow up immediately with your PCP      Follow up with PCP in 3-5 days  Proceed to  ER if symptoms worsen  Chief Complaint     Chief Complaint   Patient presents with    Cough     pt presents with cough for one week; nasal congestion, PND, sore throat         History of Present Illness       The patient is a 53-year-old male who presents today for a one week hx of dry cough, nasal congestion, PND and pharyngitis  His children had similar symptoms and all tested negative for COVID  His sx began with cough, fever, headache, PND and congestion  He states that his worst presenting sx today is his  Cough that is now productive of a white sputum and pharyngitis  He has good PO intake  He denies dyspnea, wheezing, chest tightness, chest pain, palpitations  No hx of asthma or smoking  No recent travel  Review of Systems   Review of Systems   Constitutional: Negative for activity change, appetite change, chills, diaphoresis, fatigue and fever  HENT: Positive for congestion, postnasal drip, rhinorrhea and sore throat   Negative for ear discharge, ear pain, facial swelling, hearing loss, sinus pressure, sinus pain, tinnitus, trouble swallowing and voice change  Eyes: Negative for visual disturbance  Respiratory: Positive for cough  Negative for apnea, chest tightness, shortness of breath, wheezing and stridor  Cardiovascular: Negative for chest pain, palpitations and leg swelling  Gastrointestinal: Negative for abdominal distention, abdominal pain, diarrhea, nausea and vomiting  Genitourinary: Negative for decreased urine volume  Musculoskeletal: Negative for arthralgias, joint swelling, myalgias, neck pain and neck stiffness  Skin: Negative for rash  Allergic/Immunologic: Negative for immunocompromised state  Neurological: Negative for dizziness, weakness, light-headedness, numbness and headaches  Hematological: Negative for adenopathy  Current Medications       Current Outpatient Medications:     benzonatate (TESSALON PERLES) 100 mg capsule, Take 1 capsule (100 mg total) by mouth 3 (three) times a day as needed for cough, Disp: 20 capsule, Rfl: 0    fluticasone (FLONASE) 50 mcg/act nasal spray, 1 spray into each nostril daily, Disp: 1 Bottle, Rfl: 0    methylPREDNISolone 4 MG tablet therapy pack, Use as directed on package (Patient not taking: Reported on 2/6/2021), Disp: 1 each, Rfl: 0    Current Allergies     Allergies as of 02/06/2021    (No Known Allergies)            The following portions of the patient's history were reviewed and updated as appropriate: allergies, current medications, past family history, past medical history, past social history, past surgical history and problem list      Past Medical History:   Diagnosis Date    Pancreatitis 2012    Hospitalized for Pancreatitis    Reflux gastritis        Past Surgical History:   Procedure Laterality Date    APPENDECTOMY      ESOPHAGOGASTRODUODENOSCOPY N/A 2/29/2016    Procedure: ESOPHAGOGASTRODUODENOSCOPY (EGD); Surgeon: Ban Manning MD;  Location: [facility];   Service:        History reviewed  No pertinent family history  Medications have been verified  Objective   /72   Pulse 76   Temp 97 9 °F (36 6 °C)   Resp 16   Wt 104 kg (230 lb)   SpO2 100%   BMI 29 53 kg/m²   No LMP for male patient  Physical Exam     Physical Exam  Vitals signs and nursing note reviewed  Constitutional:       General: He is not in acute distress  Appearance: He is well-developed  He is not diaphoretic  HENT:      Head: Normocephalic and atraumatic  Right Ear: Hearing, tympanic membrane, ear canal and external ear normal       Left Ear: Hearing, tympanic membrane, ear canal and external ear normal       Nose: Congestion and rhinorrhea present  No mucosal edema  Rhinorrhea is clear  Right Sinus: No maxillary sinus tenderness or frontal sinus tenderness  Left Sinus: No maxillary sinus tenderness or frontal sinus tenderness  Mouth/Throat:      Pharynx: Uvula midline  No oropharyngeal exudate, posterior oropharyngeal erythema or uvula swelling  Tonsils: No tonsillar abscesses  Neck:      Musculoskeletal: Normal range of motion and neck supple  Cardiovascular:      Rate and Rhythm: Normal rate and regular rhythm  Heart sounds: S1 normal and S2 normal  Heart sounds not distant  No murmur  No friction rub  No gallop  No S3 or S4 sounds  Pulmonary:      Effort: No tachypnea, bradypnea, accessory muscle usage or respiratory distress  Breath sounds: Normal breath sounds and air entry  No stridor, decreased air movement or transmitted upper airway sounds  No decreased breath sounds, wheezing, rhonchi or rales  Lymphadenopathy:      Cervical: No cervical adenopathy  Neurological:      Mental Status: He is alert and oriented to person, place, and time     Psychiatric:         Behavior: Behavior normal

## 2021-02-06 NOTE — PATIENT INSTRUCTIONS
Upper Respiratory Infection   WHAT YOU NEED TO KNOW:   An upper respiratory infection is also called a cold  It can affect your nose, throat, ears, and sinuses  Cold symptoms are usually worst for the first 3 to 5 days  Most people get better in 7 to 14 days  You may continue to cough for 2 to 3 weeks  Colds are caused by viruses and do not get better with antibiotics  DISCHARGE INSTRUCTIONS:   Call your local emergency number (911 in the [address] [facility]) if:   · You have chest pain or trouble breathing  Return to the emergency department if:   · You have a fever over 102ºF (39ºC)  Call your doctor if:   · You have a low fever  · Your sore throat gets worse or you see white or yellow spots in your throat  · Your symptoms get worse after 3 to 5 days or are not better in 14 days  · You have a rash anywhere on your skin  · You have large, tender lumps in your neck  · You have thick, green, or yellow drainage from your nose  · You cough up thick yellow, green, or bloody mucus  · You have a bad earache  · You have questions or concerns about your condition or care  Medicines: You may need any of the following:  · Decongestants  help reduce nasal congestion and help you breathe more easily  If you take decongestant pills, they may make you feel restless or cause problems with your sleep  Do not use decongestant sprays for more than a few days  · Cough suppressants  help reduce coughing  Ask your healthcare provider which type of cough medicine is best for you  · NSAIDs , such as ibuprofen, help decrease swelling, pain, and fever  NSAIDs can cause stomach bleeding or kidney problems in certain people  If you take blood thinner medicine, always ask your healthcare provider if NSAIDs are safe for you  Always read the medicine label and follow directions  · Acetaminophen  decreases pain and fever  It is available without a doctor's order  Ask how much to take and how often to take it  Follow directions  Read the labels of all other medicines you are using to see if they also contain acetaminophen, or ask your doctor or pharmacist  Acetaminophen can cause liver damage if not taken correctly  Do not use more than 4 grams (4,000 milligrams) total of acetaminophen in one day  · Take your medicine as directed  Contact your healthcare provider if you think your medicine is not helping or if you have side effects  Tell him or her if you are allergic to any medicine  Keep a list of the medicines, vitamins, and herbs you take  Include the amounts, and when and why you take them  Bring the list or the pill bottles to follow-up visits  Carry your medicine list with you in case of an emergency  Self-care:   · Rest as much as possible  Slowly start to do more each day  · Drink more liquids as directed  Liquids will help thin and loosen mucus so you can cough it up  Liquids will also help prevent dehydration  Liquids that help prevent dehydration include water, fruit juice, and broth  Do not drink liquids that contain caffeine  Caffeine can increase your risk for dehydration  Ask your healthcare provider how much liquid to drink each day  · Soothe a sore throat  Gargle with warm salt water  Make salt water by dissolving ¼ teaspoon salt in 1 cup warm water  You may also suck on hard candy or throat lozenges  You may use a sore throat spray  · Use a humidifier or vaporizer  Use a cool mist humidifier or a vaporizer to increase air moisture in your home  This may make it easier for you to breathe and help decrease your cough  · Use saline nasal drops as directed  These help relieve congestion  · Apply petroleum-based jelly around the outside of your nostrils  This can decrease irritation from blowing your nose  · Do not smoke  Nicotine and other chemicals in cigarettes and cigars can make your symptoms worse  They can also cause infections such as bronchitis or pneumonia   Ask your healthcare provider for information if you currently smoke and need help to quit  E-cigarettes or smokeless tobacco still contain nicotine  Talk to your healthcare provider before you use these products  Prevent a cold:   · Wash your hands often  Use soap and water every time you wash your hands  Rub your soapy hands together, lacing your fingers  Use the fingers of one hand to scrub under the nails of the other hand  Wash for at least 20 seconds  Rinse with warm, running water for several seconds  Then dry your hands  Use germ-killing gel if soap and water are not available  Do not touch your eyes or mouth without washing your hands first          · Cover a sneeze or cough  Use a tissue that covers your mouth and nose  Put the used tissue in the trash right away  Use the bend of your arm if a tissue is not available  Wash your hands well with soap and water or use a hand   Do not stand close to anyone who is sneezing or coughing  · Try to stay away from others while you are sick  This is especially important during the first 2 to 3 days when the virus is more easily spread  Wait until a fever, cough, or other symptoms are gone before you return to work or other regular activities  · Do not share items while you are sick  This includes food, drinks, eating utensils, and dishes  Follow up with your doctor as directed:  Write down your questions so you remember to ask them during your visits  © Copyright [address] Information is for End User's use only and may not be sold, redistributed or otherwise used for commercial purposes  All illustrations and images included in CareNotes® are the copyrighted property of [employer]  or [facility] Shavon Whitt   The above information is an  only  It is not intended as medical advice for individual conditions or treatments   Talk to your doctor, nurse or pharmacist before following any medical regimen to see if it is safe and effective for you  Acute Upper Respiratory Tract Infection:   -There is no sign of bacterial infection on exam at this time  This is likely a viral illness  Advised supportive measures   -Fluticasone Nasal Spray for PND and congestion  Tessalon perles for the cough  -You can use OTC zyrtec or claritin  You can OTC mucinex  -Use a humidifier next to your bed  Take steam showers     -You can take Advil or Tylenol for fever or pain  -Vitamin C and Vitamin D daily  -Stay very well hydrated and rest   -If your symptoms persist or worsen follow up immediately with your PCP

## 2021-03-02 ENCOUNTER — OFFICE VISIT (OUTPATIENT)
Dept: FAMILY MEDICINE CLINIC | Facility: CLINIC | Age: 51
End: 2021-03-02
Payer: COMMERCIAL

## 2021-03-02 VITALS
HEIGHT: 72 IN | BODY MASS INDEX: 30.88 KG/M2 | RESPIRATION RATE: 18 BRPM | SYSTOLIC BLOOD PRESSURE: 130 MMHG | OXYGEN SATURATION: 98 % | TEMPERATURE: 97.8 F | DIASTOLIC BLOOD PRESSURE: 80 MMHG | HEART RATE: 71 BPM | WEIGHT: 228 LBS

## 2021-03-02 DIAGNOSIS — Z13.6 SCREENING FOR CARDIOVASCULAR CONDITION: ICD-10-CM

## 2021-03-02 DIAGNOSIS — E66.9 OBESITY (BMI 30.0-34.9): ICD-10-CM

## 2021-03-02 DIAGNOSIS — R61 NIGHT SWEATS: ICD-10-CM

## 2021-03-02 DIAGNOSIS — R10.32 LEFT LOWER QUADRANT ABDOMINAL PAIN: ICD-10-CM

## 2021-03-02 DIAGNOSIS — Z11.4 SCREENING FOR HIV (HUMAN IMMUNODEFICIENCY VIRUS): ICD-10-CM

## 2021-03-02 DIAGNOSIS — R51.9 NONINTRACTABLE HEADACHE, UNSPECIFIED CHRONICITY PATTERN, UNSPECIFIED HEADACHE TYPE: ICD-10-CM

## 2021-03-02 DIAGNOSIS — Z00.00 WELL ADULT EXAM: Primary | ICD-10-CM

## 2021-03-02 DIAGNOSIS — Z12.5 SCREENING FOR PROSTATE CANCER: ICD-10-CM

## 2021-03-02 PROCEDURE — 99396 PREV VISIT EST AGE 40-64: CPT | Performed by: FAMILY MEDICINE

## 2021-03-02 PROCEDURE — 1036F TOBACCO NON-USER: CPT | Performed by: FAMILY MEDICINE

## 2021-03-02 PROCEDURE — 3008F BODY MASS INDEX DOCD: CPT | Performed by: FAMILY MEDICINE

## 2021-03-02 PROCEDURE — 3725F SCREEN DEPRESSION PERFORMED: CPT | Performed by: FAMILY MEDICINE

## 2021-03-02 NOTE — PATIENT INSTRUCTIONS
Obesity   AMBULATORY CARE:   Obesity  is when your body mass index (BMI) is greater than 30  Your healthcare provider will use your height and weight to measure your BMI  The risks of obesity include  many health problems, such as injuries or physical disability  You may need tests to check for the following:  · Diabetes    · High blood pressure or high cholesterol    · Heart disease    · Gallbladder or liver disease    · Cancer of the colon, breast, prostate, liver, or kidney    · Sleep apnea    · Arthritis or gout    Seek care immediately if:   · You have a severe headache, confusion, or difficulty speaking  · You have weakness on one side of your body  · You have chest pain, sweating, or shortness of breath  Contact your healthcare provider if:   · You have symptoms of gallbladder or liver disease, such as pain in your upper abdomen  · You have knee or hip pain and discomfort while walking  · You have symptoms of diabetes, such as intense hunger and thirst, and frequent urination  · You have symptoms of sleep apnea, such as snoring or daytime sleepiness  · You have questions or concerns about your condition or care  Treatment for obesity  focuses on helping you lose weight to improve your health  Even a small decrease in BMI can reduce the risk for many health problems  Your healthcare provider will help you set a weight-loss goal   · Lifestyle changes  are the first step in treating obesity  These include making healthy food choices and getting regular physical activity  Your healthcare provider may suggest a weight-loss program that involves coaching, education, and therapy  · Medicine  may help you lose weight when it is used with a healthy diet and physical activity  · Surgery  can help you lose weight if you are very obese and have other health problems  There are several types of weight-loss surgery  Ask your healthcare provider for more information      Be successful losing weight:   · Set small, realistic goals  An example of a small goal is to walk for 20 minutes 5 days a week  Anther goal is to lose 5% of your body weight  · Tell friends, family members, and coworkers about your goals  and ask for their support  Ask a friend to lose weight with you, or join a weight-loss support group  · Identify foods or triggers that may cause you to overeat , and find ways to avoid them  Remove tempting high-calorie foods from your home and workplace  Place a bowl of fresh fruit on your kitchen counter  If stress causes you to eat, then find other ways to cope with stress  · Keep a diary to track what you eat and drink  Also write down how many minutes of physical activity you do each day  Weigh yourself once a week and record it in your diary  Eating changes: You will need to eat 500 to 1,000 fewer calories each day than you currently eat to lose 1 to 2 pounds a week  The following changes will help you cut calories:  · Eat smaller portions  Use small plates, no larger than 9 inches in diameter  Fill your plate half full of fruits and vegetables  Measure your food using measuring cups until you know what a serving size looks like  · Eat 3 meals and 1 or 2 snacks each day  Plan your meals in advance  [email or web address] and eat at home most of the time  Eat slowly  Do not skip meals  Skipping meals can lead to overeating later in the day  This can make it harder for you to lose weight  Talk with a dietitian to help you make a meal plan and schedule that is right for you  · Eat fruits and vegetables at every meal   They are low in calories and high in fiber, which makes you feel full  Do not add butter, margarine, or cream sauce to vegetables  Use herbs to season steamed vegetables  · Eat less fat and fewer fried foods  Eat more baked or grilled chicken and fish  These protein sources are lower in calories and fat than red meat  Limit fast food   Dress your salads with olive oil and vinegar instead of bottled dressing  · Limit the amount of sugar you eat  Do not drink sugary beverages  Limit alcohol  Activity changes:  Physical activity is good for your body in many ways  It helps you burn calories and build strong muscles  It decreases stress and depression, and improves your mood  It can also help you sleep better  Talk to your healthcare provider before you begin an exercise program   · Exercise for at least 30 minutes 5 days a week  Start slowly  Set aside time each day for physical activity that you enjoy and that is convenient for you  It is best to do both weight training and an activity that increases your heart rate, such as walking, bicycling, or swimming  · Find ways to be more active  Do yard work and housecleaning  Walk up the stairs instead of using elevators  Spend your leisure time going to events that require walking, such as outdoor festivals or fairs  This extra physical activity can help you lose weight and keep it off  Follow up with your healthcare provider as directed: You may need to meet with a dietitian  Write down your questions so you remember to ask them during your visits  © Copyright [facility] [address] Information is for End User's use only and may not be sold, redistributed or otherwise used for commercial purposes  All illustrations and images included in CareNotes® are the copyrighted property of [employer]  or [facility] Shavon Whitt   The above information is an  only  It is not intended as medical advice for individual conditions or treatments  Talk to your doctor, nurse or pharmacist before following any medical regimen to see if it is safe and effective for you

## 2021-03-02 NOTE — PROGRESS NOTES
FAMILY PRACTICE HEALTH MAINTENANCE OFFICE VISIT  [facility]    NAME: Rigoberto Rogel  AGE: 45 y o  SEX: male  : 1970     DATE: 3/2/2021    Assessment and Plan     1  Well adult exam  -     CBC; Future    2  Left lower quadrant abdominal pain  -     Ambulatory referral to Gastroenterology; Future  -     CT abdomen pelvis wo contrast; Future; Expected date: 2021  -     CBC; Future  -     Amylase; Future  -     Lipase; Future    3  Night sweats  -     CT abdomen pelvis wo contrast; Future; Expected date: 2021  -     CBC; Future    4  Nonintractable headache, unspecified chronicity pattern, unspecified headache type  -     MRI brain wo contrast; Future; Expected date: 2021  -     CBC; Future    5  Screening for cardiovascular condition  -     Comprehensive metabolic panel; Future  -     CBC; Future  -     Lipid Panel with Direct LDL reflex; Future    6  Screening for prostate cancer  -     CBC; Future  -     PSA, Total Screen; Future    7  Screening for HIV (human immunodeficiency virus)  -     HIV 1/2 Antigen/Antibody (4th Generation) w Reflex [facility]; Future    8  Obesity (BMI 30 0-34 9)    9  BMI 30 0-30 9,adult        · Patient Counseling:   · Nutrition: Stressed importance of a well balanced diet, moderation of sodium/saturated fat, caloric balance and sufficient intake of fiber  · Exercise: Stressed the importance of regular exercise with a goal of 150 minutes per week  · Dental Health: Discussed daily flossing and brushing and regular dental visits     · Immunizations reviewed: Up To Date  · Discussed benefits of:  Colon Cancer Screening, Prostate Cancer Screening  and Screening labs   BMI Counseling: Body mass index is 30 92 kg/m²  Discussed with patient's BMI with him  The BMI is above normal  Nutrition recommendations include reducing portion sizes  Return for Next scheduled follow up          Chief Complaint     Chief Complaint   Patient presents with    Establish Care     Needs PCP  michael    Physical Exam       History of Present Illness     Pt is here for the first time for a physical      Pt states he was in the hospital had a gastric biopsy by Dr Luciano Mcmillan - pt was to have follow up and a CT scan Pt states he did not do that  Pt states he still is bloated and states he has discomfort in his left lower quad - this was 4-5 years ago  Pt states he wakes up in the middle of the night sweating    Pt states right now he feels fine but states he sometimes he gets pressure in his head - this has been going on for over a year  States he has a lump  Pts mom had a live tumor  This lump for the pt has been 5 years  The night sweats are new  Pt states his vision also has been changing      Well Adult Physical   Patient here for a comprehensive physical exam       Diet and Physical Activity  Diet: well balanced diet  Exercise: frequently      Depression Screen  PHQ-9 Depression Screening    PHQ-9:   Frequency of the following problems over the past two weeks:      Little interest or pleasure in doing things: 0 - not at all  Feeling down, depressed, or hopeless: 0 - not at all  PHQ-2 Score: 0          General Health  Hearing: Normal:  bilateral  Vision: vision problems: changes in vision  Dental: regular dental visits    Reproductive Health  No issues       The following portions of the patient's history were reviewed and updated as appropriate: allergies, current medications, past family history, past medical history, past social history, past surgical history and problem list     Review of Systems     Review of Systems   Constitutional: Negative for activity change, appetite change, chills, diaphoresis, fatigue, fever and unexpected weight change  HENT: Negative for congestion, dental problem, ear pain, mouth sores, sinus pressure, sinus pain, sore throat and trouble swallowing  Eyes: Negative for photophobia, discharge and itching     Respiratory: Negative for apnea, chest tightness and shortness of breath  Cardiovascular: Negative for chest pain, palpitations and leg swelling  Gastrointestinal: Positive for abdominal pain  Negative for abdominal distention, blood in stool, nausea and vomiting  Endocrine: Negative for cold intolerance, heat intolerance, polydipsia, polyphagia and polyuria  Genitourinary: Negative for difficulty urinating  Musculoskeletal: Negative for arthralgias  Skin: Negative for color change and wound  Neurological: Positive for headaches  Negative for dizziness, syncope and speech difficulty  Hematological: Negative for adenopathy  Psychiatric/Behavioral: Negative for agitation and behavioral problems  Past Medical History     Past Medical History:   Diagnosis Date    H  pylori infection     Pancreatitis 2012    Hospitalized for Pancreatitis    Reflux gastritis        Past Surgical History     Past Surgical History:   Procedure Laterality Date    APPENDECTOMY      ESOPHAGOGASTRODUODENOSCOPY N/A 2/29/2016    Procedure: ESOPHAGOGASTRODUODENOSCOPY (EGD); Surgeon: Nimisha Harris MD;  Location: [facility];   Service:        Social History     Social History     Socioeconomic History    Marital status: /Civil Union     Spouse name: None    Number of children: None    Years of education: None    Highest education level: None   Occupational History    None   Social Needs    Financial resource strain: None    Food insecurity     Worry: None     Inability: None    Transportation needs     Medical: None     Non-medical: None   Tobacco Use    Smoking status: Never Smoker    Smokeless tobacco: Never Used   Substance and Sexual Activity    Alcohol use: Yes     Frequency: Monthly or less     Comment: Occasionally    Drug use: No    Sexual activity: None   Lifestyle    Physical activity     Days per week: None     Minutes per session: None    Stress: None   Relationships    Social connections Talks on phone: None     Gets together: None     Attends Anabaptist service: None     Active member of club or organization: None     Attends meetings of clubs or organizations: None     Relationship status: None    Intimate partner violence     Fear of current or ex partner: None     Emotionally abused: None     Physically abused: None     Forced sexual activity: None   Other Topics Concern    None   Social History Narrative    None       Family History     Family History   Problem Relation Age of Onset    Brain cancer Mother     Stroke Mother     Dementia Father     Mental illness Neg Hx        Current Medications     No current outpatient medications on file  Allergies     No Known Allergies    Objective     /80   Pulse 71   Temp 97 8 °F (36 6 °C)   Resp 18   Ht 6' (1 829 m)   Wt 103 kg (228 lb)   SpO2 98%   BMI 30 92 kg/m²      Physical Exam  Vitals signs and nursing note reviewed  Constitutional:       General: He is not in acute distress  Appearance: He is well-developed  He is not diaphoretic  HENT:      Head: Normocephalic and atraumatic  Right Ear: External ear normal       Left Ear: External ear normal       Nose: Nose normal       Mouth/Throat:      Pharynx: No oropharyngeal exudate  Eyes:      General: No scleral icterus  Right eye: No discharge  Left eye: No discharge  Pupils: Pupils are equal, round, and reactive to light  Neck:      Thyroid: No thyromegaly  Cardiovascular:      Rate and Rhythm: Normal rate  Heart sounds: Normal heart sounds  No murmur  Pulmonary:      Effort: Pulmonary effort is normal  No respiratory distress  Breath sounds: Normal breath sounds  No wheezing  Abdominal:      General: Bowel sounds are normal  There is no distension  Palpations: Abdomen is soft  There is no mass  Tenderness: There is abdominal tenderness (left lower quad)  There is no guarding or rebound     Genitourinary: Prostate: Normal    Musculoskeletal: Normal range of motion  Skin:     General: Skin is warm and dry  Findings: No erythema or rash  Comments: Slight bump on inion of head  Poss asymmetry on rt side seems like bone of skull   Neurological:      Mental Status: He is alert  Coordination: Coordination normal       Deep Tendon Reflexes: Reflexes normal    Psychiatric:         Behavior: Behavior normal             Visual Acuity Screening    Right eye Left eye Both eyes   Without correction:      With correction: 20/20 20/20 20/20           Bobbette Boxer, DO  [facility]  BMI Counseling: Body mass index is 30 92 kg/m²  The BMI is above normal  Nutrition recommendations include reducing portion sizes

## 2021-03-10 ENCOUNTER — TELEPHONE (OUTPATIENT)
Dept: FAMILY MEDICINE CLINIC | Facility: CLINIC | Age: 51
End: 2021-03-10

## 2021-03-10 DIAGNOSIS — R51.9 NONINTRACTABLE HEADACHE, UNSPECIFIED CHRONICITY PATTERN, UNSPECIFIED HEADACHE TYPE: ICD-10-CM

## 2021-03-10 DIAGNOSIS — R61 NIGHT SWEATS: Primary | ICD-10-CM

## 2021-03-10 NOTE — TELEPHONE ENCOUNTER
[employer] called stating that [id number] sent us a fax requesting more information and a Peer to Peer regarding pt MRI denial   Pls check [employer] and respond to fax

## 2021-03-11 ENCOUNTER — TELEPHONE (OUTPATIENT)
Dept: NEUROLOGY | Facility: CLINIC | Age: 51
End: 2021-03-11

## 2021-03-11 LAB
ALBUMIN SERPL-MCNC: 4.9 G/DL (ref 3.8–4.9)
ALBUMIN/GLOB SERPL: 1.9 {RATIO} (ref 1.2–2.2)
ALP SERPL-CCNC: 88 IU/L (ref 39–117)
ALT SERPL-CCNC: 41 IU/L (ref 0–44)
AMYLASE SERPL-CCNC: 63 U/L (ref 31–110)
AST SERPL-CCNC: 28 IU/L (ref 0–40)
BASOPHILS # BLD AUTO: 0 X10E3/UL (ref 0–0.2)
BASOPHILS NFR BLD AUTO: 1 %
BILIRUB SERPL-MCNC: 0.5 MG/DL (ref 0–1.2)
BUN SERPL-MCNC: 13 MG/DL (ref 6–24)
BUN/CREAT SERPL: 14 (ref 9–20)
CALCIUM SERPL-MCNC: 10 MG/DL (ref 8.7–10.2)
CHLORIDE SERPL-SCNC: 100 MMOL/L (ref 96–106)
CHOLEST SERPL-MCNC: 179 MG/DL (ref 100–199)
CO2 SERPL-SCNC: 23 MMOL/L (ref 20–29)
CREAT SERPL-MCNC: 0.95 MG/DL (ref 0.76–1.27)
EOSINOPHIL # BLD AUTO: 0.1 X10E3/UL (ref 0–0.4)
EOSINOPHIL NFR BLD AUTO: 1 %
ERYTHROCYTE [DISTWIDTH] IN BLOOD BY AUTOMATED COUNT: 13 % (ref 11.6–15.4)
GLOBULIN SER-MCNC: 2.6 G/DL (ref 1.5–4.5)
GLUCOSE SERPL-MCNC: 98 MG/DL (ref 65–99)
HCT VFR BLD AUTO: 46.1 % (ref 37.5–51)
HDLC SERPL-MCNC: 42 MG/DL
HGB BLD-MCNC: 15.4 G/DL (ref 13–17.7)
HIV 1+2 AB+HIV1 P24 AG SERPL QL IA: NON REACTIVE
IMM GRANULOCYTES # BLD: 0 X10E3/UL (ref 0–0.1)
IMM GRANULOCYTES NFR BLD: 0 %
LDLC SERPL CALC-MCNC: 115 MG/DL (ref 0–99)
LIPASE SERPL-CCNC: 39 U/L (ref 13–78)
LYMPHOCYTES # BLD AUTO: 1.7 X10E3/UL (ref 0.7–3.1)
LYMPHOCYTES NFR BLD AUTO: 31 %
MCH RBC QN AUTO: 28.9 PG (ref 26.6–33)
MCHC RBC AUTO-ENTMCNC: 33.4 G/DL (ref 31.5–35.7)
MCV RBC AUTO: 87 FL (ref 79–97)
MICRODELETION SYND BLD/T FISH: NORMAL
MONOCYTES # BLD AUTO: 0.5 X10E3/UL (ref 0.1–0.9)
MONOCYTES NFR BLD AUTO: 9 %
NEUTROPHILS # BLD AUTO: 3.1 X10E3/UL (ref 1.4–7)
NEUTROPHILS NFR BLD AUTO: 58 %
PLATELET # BLD AUTO: 274 X10E3/UL (ref 150–450)
POTASSIUM SERPL-SCNC: 4.4 MMOL/L (ref 3.5–5.2)
PROT SERPL-MCNC: 7.5 G/DL (ref 6–8.5)
PSA SERPL-MCNC: 0.4 NG/ML (ref 0–4)
RBC # BLD AUTO: 5.33 X10E6/UL (ref 4.14–5.8)
SL AMB EGFR AFRICAN AMERICAN: 107 ML/MIN/1.73
SL AMB EGFR NON AFRICAN AMERICAN: 92 ML/MIN/1.73
SODIUM SERPL-SCNC: 137 MMOL/L (ref 134–144)
TRIGL SERPL-MCNC: 122 MG/DL (ref 0–149)
WBC # BLD AUTO: 5.3 X10E3/UL (ref 3.4–10.8)

## 2021-03-11 NOTE — TELEPHONE ENCOUNTER
Best contact number for [id number]:[phone]    Emergency Contact name and number:None     Referring provider and telephone number:[facility] Provider Name and if affiliated with [facility]: Linnette Ends     Reason for Appointment/Dx:Headache     Have you seen and followed up with a pediatric Neurologist for this disease in the past?  No    Neurology Location patient would like to be seen:[facility]     Order received? Yes                                                 Records Received? No     Have you ever seen another Neurologist?       No     Insurance [facility]     ID/Policy #:    Secondary Insurance:    ID/Policy#: Workman's Comp/ Accident/ School  Information      Workman's Comp/Accident/School related?        None    If yes name of Insurance company:    Date of Injury:    Type of Injury:     Name and Telephone Number:    Notes:Appointment schedule with patient new patient pack sent                    Appointment date: 05-03-21 11:00am with Dr Reynold Kaplan

## 2021-03-11 NOTE — TELEPHONE ENCOUNTER
Patient advised that MRI of brain was denied  He would like a referral to a nuerologist  Please advise    Ike Kocher, MA

## 2021-03-11 NOTE — TELEPHONE ENCOUNTER
I ordered an MRI of the brain for HA  Sometimes they ap[prove them sometimes they dont, what do they want me to review?

## 2021-03-11 NOTE — TELEPHONE ENCOUNTER
MRI is denied for lack of clinical    Please refer to specialist    I will make prior auth team aware

## 2021-03-11 NOTE — TELEPHONE ENCOUNTER
Please call pt and let him know that the MRI for the Brain was denied they want him to see a specialist - neurology would be the choice

## 2021-03-12 DIAGNOSIS — R61 NIGHT SWEATS: ICD-10-CM

## 2021-03-12 DIAGNOSIS — R10.32 LEFT LOWER QUADRANT ABDOMINAL PAIN: Primary | ICD-10-CM

## 2021-03-19 ENCOUNTER — HOSPITAL ENCOUNTER (OUTPATIENT)
Dept: RADIOLOGY | Facility: HOSPITAL | Age: 51
Discharge: HOME | End: 2021-03-19
Attending: FAMILY MEDICINE
Payer: COMMERCIAL

## 2021-03-19 DIAGNOSIS — R61 NIGHT SWEATS: ICD-10-CM

## 2021-03-19 DIAGNOSIS — R10.32 LEFT LOWER QUADRANT ABDOMINAL PAIN: ICD-10-CM

## 2021-03-19 PROCEDURE — G1004 CDSM NDSC: HCPCS

## 2021-03-19 PROCEDURE — 74177 CT ABD & PELVIS W/CONTRAST: CPT

## 2021-03-19 RX ADMIN — IOHEXOL 100 ML: 350 INJECTION, SOLUTION INTRAVENOUS at 09:50

## 2021-03-24 PROBLEM — K76.0 HEPATIC STEATOSIS: Status: ACTIVE | Noted: 2021-03-24

## 2021-03-25 ENCOUNTER — TELEPHONE (OUTPATIENT)
Dept: FAMILY MEDICINE CLINIC | Facility: CLINIC | Age: 51
End: 2021-03-25

## 2021-03-25 DIAGNOSIS — R91.1 PULMONARY NODULE, LEFT: Primary | ICD-10-CM

## 2021-03-25 NOTE — TELEPHONE ENCOUNTER
Spoke with pt about The ct scan  Pt advised I will ordering a ct scan to evaluate the ling nodule that was seen in year    Pt understands

## 2021-04-06 ENCOUNTER — CONSULT (OUTPATIENT)
Dept: NEUROLOGY | Facility: CLINIC | Age: 51
End: 2021-04-06
Payer: COMMERCIAL

## 2021-04-06 VITALS
HEART RATE: 83 BPM | HEIGHT: 72 IN | WEIGHT: 225 LBS | BODY MASS INDEX: 30.48 KG/M2 | SYSTOLIC BLOOD PRESSURE: 102 MMHG | DIASTOLIC BLOOD PRESSURE: 80 MMHG

## 2021-04-06 DIAGNOSIS — R51.9 CHRONIC HEADACHE WITH NEW FEATURES: Primary | ICD-10-CM

## 2021-04-06 DIAGNOSIS — R22.0 LOCALIZED SWELLING, MASS AND LUMP, HEAD: ICD-10-CM

## 2021-04-06 DIAGNOSIS — G89.29 CHRONIC HEADACHE WITH NEW FEATURES: Primary | ICD-10-CM

## 2021-04-06 DIAGNOSIS — R51.9 NONINTRACTABLE HEADACHE, UNSPECIFIED CHRONICITY PATTERN, UNSPECIFIED HEADACHE TYPE: ICD-10-CM

## 2021-04-06 PROCEDURE — 3008F BODY MASS INDEX DOCD: CPT | Performed by: PSYCHIATRY & NEUROLOGY

## 2021-04-06 PROCEDURE — 1036F TOBACCO NON-USER: CPT | Performed by: PSYCHIATRY & NEUROLOGY

## 2021-04-06 PROCEDURE — 99244 OFF/OP CNSLTJ NEW/EST MOD 40: CPT | Performed by: PSYCHIATRY & NEUROLOGY

## 2021-04-06 NOTE — PROGRESS NOTES
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 but now it's affecting his work life  He describes pressure type of pain in occipital region  Denies associated nausea, vomiting  Denies associated light or noise sensitivity  He reports there is lump in occipital region which feels as though growing  Stress can make it worse  His pain is once a week  It can last anywhere from 2 days to 2 weeks  he says pain is not severe  He can work through it  He reports about 6-7 days of headaches in a month  At work, he feels uncomfortable wearing hazard hat because of growing lump  Denies any head trauma  He reports tingling in his hands  Past Medical History:   Diagnosis Date    H  pylori infection     Migraine headache     Pancreatitis 2012    Hospitalized for Pancreatitis    Reflux gastritis                No current outpatient medications on file prior to visit  No current facility-administered medications on file prior to visit  No Known Allergies      Family History   Problem Relation Age of Onset    Brain cancer Mother     Stroke Mother     Dementia Father     Mental illness Neg Hx                 Past Surgical History:   Procedure Laterality Date    APPENDECTOMY      ESOPHAGOGASTRODUODENOSCOPY N/A 2/29/2016    Procedure: ESOPHAGOGASTRODUODENOSCOPY (EGD); Surgeon: Fang Arias MD;  Location: [facility] GI [facility];   Service:            Social History     Socioeconomic History    Marital status: /Civil Union     Spouse name: Not on file    Number of children: Not on file    Years of education: Not on file    Highest education level: Not on file   Occupational History    Not on file   Social Needs    Financial resource strain: Not on file    Food insecurity     Worry: Not on file     Inability: Not on file    Transportation needs     Medical: Not on file     Non-medical: Not on file   Tobacco Use    Smoking status: Never Smoker    Smokeless tobacco: Never Used   Substance and Sexual Activity    Alcohol use: Yes     Frequency: Monthly or less     Comment: Occasionally    Drug use: No    Sexual activity: Not on file   Lifestyle    Physical activity     Days per week: Not on file     Minutes per session: Not on file    Stress: Not on file   Relationships    Social connections     Talks on phone: Not on file     Gets together: Not on file     Attends Catholic service: Not on file     Active member of club or organization: Not on file     Attends meetings of clubs or organizations: Not on file     Relationship status: Not on file    Intimate partner violence     Fear of current or ex partner: Not on file     Emotionally abused: Not on file     Physically abused: Not on file     Forced sexual activity: Not on file   Other Topics Concern    Not on file   Social History Narrative    Not on file       Review of Systems  Refer to positive review of systems in HPI  Constitutional- No fever  Eyes- No visual change  ENT- Hearing normal  CV- No chest pain  Resp- No Shortness of breath  GI- No diarrhea  - Bladder normal  MS- No Arthritis   Skin- No rash  Psych- No depression  Endo- No DM  Heme- No nodes    PHYSICAL EXAM:    Vitals:    04/06/21 1000   BP: 102/80   BP Location: Left arm   Patient Position: Sitting   Cuff Size: Large   Pulse: 83   Weight: 102 kg (225 lb)   Height: 6' (1 829 m)         Appearance: No Acute Distress  Ophthalmoscopic: Disc Flat, Normal fundus  Carotid/Heart/Peripheral Vascular: No Bruits, RRR  Orientation: Awake, Alert, and Oriented x 3  Mental status:  Memory: Registation 3/3 Recall 3/3  Attention: Normal  Knowledge: Appropriate  Language: No aphasia  Speech: No dysarthria  Cranial Nerves:  2 No Visual Defect on Confrontation; Pupils round, equal, reactive to light  3,4,6 Extraocular Movements Intact; no nystagmus  5 Facial Sensation Intact  7 No facial asymmetry  8 Intact hearing  9,10 Palate symmetric, normal gag  11 Good shoulder shrug  12 Tongue Midline  Gait: Stable, No ataxia, can perform tandem walking  Coordination: No ataxia with finger to nose testing and heel to shin testing  Sensory: Intact, Symmetric to Pinprick, Light Touch, Vibration, and Joint Position  Muscle Tone: Normal  Muscle exam  Arm Right Left Leg Right Left   Deltoid 5/5 5/5 Iliopsoas 5/5 5/5   Biceps 5/5 5/5 Quads 5/5 5/5   Triceps 5/5 5/5 Hamstrings 5/5 5/5   Wrist Extension 5/5 5/5 Ankle Dorsi Flexion 5/5 5/5   Wrist Flexion 5/5 5/5 Ankle Plantar Flexion 5/5 5/5   Interossei 5/5 5/5 Ankle Eversion 5/5 5/5   APB 5/5 5/5 Ankle Inversion 5/5 5/5       Reflexes   RJ BJ TJ KJ AJ Plantars Cain's   Right 2+ 2+ 2+ 2+ 2+ Downgoing Not present   Left 2+ 2+ 2+ 2+ 2+ Downgoing Not present       Personal review of        None relevant    Assessment/Plan:     1  Chronic headache with new features  MRI brain with and without contrast   2  Nonintractable headache, unspecified chronicity pattern, unspecified headache type  Ambulatory referral to Neurology   3  Localized swelling, mass and lump, head  MRI brain with and without contrast         Patient describes atypical headache  Lesion felt in occipital region could be a lipoma but will need further evaluation  Will get MRI brain to r/o structural process  Patient says he can tolerate the pain and at this time doesn't need to be on headache preventive therapy  Counseling Documentation:  The patient and/or patient's family were  counseled regarding diagnostic results  Instructions for management,risk factor reductions,prognosis of disease were discussed  Patient and family were educated regarding impressions,risks and benefits of treatment options,importance of compliance with treatment  Total time of encounter: 50 min  More than 50% of time was spent in counseling and coordination of care of patient  [facility]  [address]  [place]

## 2021-06-15 ENCOUNTER — HOSPITAL ENCOUNTER (OUTPATIENT)
Dept: RADIOLOGY | Facility: HOSPITAL | Age: 51
Discharge: HOME | End: 2021-06-15
Attending: PSYCHIATRY & NEUROLOGY
Payer: COMMERCIAL

## 2021-06-15 DIAGNOSIS — G89.29 CHRONIC HEADACHE WITH NEW FEATURES: ICD-10-CM

## 2021-06-15 DIAGNOSIS — R51.9 CHRONIC HEADACHE WITH NEW FEATURES: ICD-10-CM

## 2021-06-15 DIAGNOSIS — R22.0 LOCALIZED SWELLING, MASS AND LUMP, HEAD: ICD-10-CM

## 2021-06-15 PROCEDURE — G1004 CDSM NDSC: HCPCS

## 2021-06-15 PROCEDURE — A9585 GADOBUTROL INJECTION: HCPCS | Performed by: PSYCHIATRY & NEUROLOGY

## 2021-06-15 PROCEDURE — 70553 MRI BRAIN STEM W/O & W/DYE: CPT

## 2021-06-15 RX ADMIN — GADOBUTROL 10 ML: 604.72 INJECTION INTRAVENOUS at 14:37

## 2021-06-22 ENCOUNTER — TELEPHONE (OUTPATIENT)
Dept: NEUROLOGY | Facility: CLINIC | Age: 51
End: 2021-06-22

## 2021-06-22 NOTE — TELEPHONE ENCOUNTER
Pt called in for brain MRI results  Made him aware of normal results  No further questions at this time

## 2021-07-06 ENCOUNTER — OFFICE VISIT (OUTPATIENT)
Dept: NEUROLOGY | Facility: CLINIC | Age: 51
End: 2021-07-06
Payer: COMMERCIAL

## 2021-07-06 VITALS
HEIGHT: 72 IN | DIASTOLIC BLOOD PRESSURE: 75 MMHG | SYSTOLIC BLOOD PRESSURE: 125 MMHG | WEIGHT: 229 LBS | BODY MASS INDEX: 31.02 KG/M2 | HEART RATE: 68 BPM

## 2021-07-06 DIAGNOSIS — F45.41 STRESS HEADACHES: Primary | ICD-10-CM

## 2021-07-06 DIAGNOSIS — G56.03 CARPAL TUNNEL SYNDROME, BILATERAL: ICD-10-CM

## 2021-07-06 PROCEDURE — 1036F TOBACCO NON-USER: CPT | Performed by: PSYCHIATRY & NEUROLOGY

## 2021-07-06 PROCEDURE — 99214 OFFICE O/P EST MOD 30 MIN: CPT | Performed by: PSYCHIATRY & NEUROLOGY

## 2021-07-06 PROCEDURE — 3008F BODY MASS INDEX DOCD: CPT | Performed by: PSYCHIATRY & NEUROLOGY

## 2021-07-06 NOTE — PROGRESS NOTES
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 but now it's affecting his work life  He describes pressure type of pain in occipital region  Denies associated nausea, vomiting  Denies associated light or noise sensitivity  He reports there is lump in occipital region which feels as though growing  We had obtained MRI brain and it was normal except had revealed incidental finding of posterior left frontal region enhancing vessel compatible with developmental venous anomaly  There is no mention of any occipital region mass, lipoma  He was not prescribed any agent because he just wanted diagnosis  Since last visit, patient has almost no headaches  He does mention today that he was going through bad divorce and now has sole custody of his children  So his headachs could have been from stress  Lately, he hasn't noted any problem wearing hat either  He does report numbness, tingling in both hands  He does wear brace and it helps       Past Medical History:   Diagnosis Date    H  pylori infection     Migraine headache     Pancreatitis 2012    Hospitalized for Pancreatitis    Reflux gastritis      Social History     Socioeconomic History    Marital status: /Civil Union     Spouse name: Not on file    Number of children: Not on file    Years of education: Not on file    Highest education level: Not on file   Occupational History    Not on file   Tobacco Use    Smoking status: Never Smoker    Smokeless tobacco: Never Used   Vaping Use    Vaping Use: Never used   Substance and Sexual Activity    Alcohol use: Yes     Comment: Occasionally    Drug use: No    Sexual activity: Not on file   Other Topics Concern    Not on file Social History Narrative    Not on file     Social Determinants of Health     Financial Resource Strain:     Difficulty of Paying Living Expenses:    Food Insecurity:     Worried About Running Out of Food in the Last Year:     [address] in the Last Year:    Transportation Needs:     Lack of Transportation (Medical):  Lack of Transportation (Non-Medical):    Physical Activity:     Days of Exercise per Week:     Minutes of Exercise per Session:    Stress:     Feeling of Stress :    Social Connections:     Frequency of Communication with Friends and Family:     Frequency of Social Gatherings with Friends and Family:     Attends Catholic Services:     Active Member of Clubs or Organizations:     Attends Club or Organization Meetings:     Marital Status:    Intimate Partner Violence:     Fear of Current or Ex-Partner:     Emotionally Abused:     Physically Abused:     Sexually Abused:      Family History   Problem Relation Age of Onset    Brain cancer Mother     Stroke Mother     Dementia Father     Mental illness Neg Hx      No Known Allergies  No current outpatient medications on file prior to visit  No current facility-administered medications on file prior to visit  Review of Systems   Refer to positive review of systems in HPI     Review of Systems    Constitutional- No fever  Eyes- No visual change  ENT- Hearing normal  CV- No chest pain  Resp- No Shortness of breath  GI- No diarrhea  - Bladder normal  MS- No Arthritis   Skin- No rash  Psych- No depression  Endo- No DM  Heme- No nodes    Vitals:    07/06/21 0958   BP: 125/75   BP Location: Right arm   Patient Position: Sitting   Cuff Size: Adult   Pulse: 68   Weight: 104 kg (229 lb)   Height: 6' (1 829 m)       PHYSICAL EXAM:  Appearance: No Acute Distress  Ophthalmoscopic: Disc Flat, Normal fundus  Mental status:  Orientation: Awake, Alert, and Orientedx3  Memory: Registation 3/3 Recall 3/3  Attention: normal  Knowledge: good  Language: No aphasia  Speech: No dysarthria  Cranial Nerves:  2 No Visual Defect on Confrontation, Pupils round, equal, reactive to light  3,4,6 Extraocular Movements Intact, no nystagmus  5 Facial Sensation Intact  7 No facial asymmetry  8 Intact hearing  9,10 Palate symmetric, normal gag  11 Good shoulder shrug  12 Tongue Midline  Gait: Stable  Coordination: No ataxia with finger to nose testing, and heel to shin  Sensory: Intact, Symmetric to pinprick, light touch, vibration, and joint position  Muscle Tone: Normal              Muscle exam:  Arm Right Left Leg Right Left   Deltoid 5/5 5/5 Iliopsoas 5/5 5/5   Biceps 5/5 5/5 Quads 5/5 5/5   Triceps 5/5 5/5 Hamstrings 5/5 5/5   Wrist Extension 5/5 5/5 Ankle Dorsi Flexion 5/5 5/5   Wrist Flexion 5/5 5/5 Ankle Plantar Flexion 5/5 5/5   Interossei 5/5 5/5 Ankle Eversion 5/5 5/5   APB 5/5 5/5 Ankle Inversion 5/5 5/5       Reflexes   RJ BJ TJ KJ AJ Plantars Cain's   Right 2+ 2+ 2+ 2+ 2+ Downgoing Not present   Left 2+ 2+ 2+ 2+ 2+ Downgoing Not present     Personal review of  Labs:                  Diagnoses and all orders for this visit:      1  Stress headaches     2  Carpal tunnel syndrome, bilateral         We both agree that his headaches must've been from stress of divorce  We do not need any further tests or therapy  As for CTS, his sxs are mild  He can wear wrist splints at night  He doesn't feel that he needs EMG at this time                Total time of encounter:  30 min  More than 50% of the time was used in counseling and/or coordination of care  Extent of counseling and/or coordination of care        Maren Gallagher MD  [facility]  [address]  [place] [address]  [phone]

## 2021-08-27 ENCOUNTER — TELEPHONE (OUTPATIENT)
Dept: FAMILY MEDICINE CLINIC | Facility: CLINIC | Age: 51
End: 2021-08-27

## 2021-08-27 DIAGNOSIS — Z20.822 EXPOSURE TO COVID-19 VIRUS: Primary | ICD-10-CM

## 2021-08-27 PROCEDURE — U0005 INFEC AGEN DETEC AMPLI PROBE: HCPCS | Performed by: FAMILY MEDICINE

## 2021-08-27 PROCEDURE — U0003 INFECTIOUS AGENT DETECTION BY NUCLEIC ACID (DNA OR RNA); SEVERE ACUTE RESPIRATORY SYNDROME CORONAVIRUS 2 (SARS-COV-2) (CORONAVIRUS DISEASE [COVID-19]), AMPLIFIED PROBE TECHNIQUE, MAKING USE OF HIGH THROUGHPUT TECHNOLOGIES AS DESCRIBED BY CMS-2020-01-R: HCPCS | Performed by: FAMILY MEDICINE

## 2021-08-27 NOTE — TELEPHONE ENCOUNTER
Asia Anaya wife tested positive for covid 2 days ago  Patient would like to be tested  He stated he is starting to feel sick  We have no apts left for today,   Can we just put an order in chart please    Thank you

## 2021-08-27 NOTE — TELEPHONE ENCOUNTER
Spoke with pt, he is aware an order for COVID 19 was put into his chart  I gave him care nows information  No further action needed   Beny Garza LPN

## 2021-08-30 ENCOUNTER — TELEMEDICINE (OUTPATIENT)
Dept: FAMILY MEDICINE CLINIC | Facility: CLINIC | Age: 51
End: 2021-08-30
Payer: COMMERCIAL

## 2021-08-30 ENCOUNTER — TELEPHONE (OUTPATIENT)
Dept: FAMILY MEDICINE CLINIC | Facility: CLINIC | Age: 51
End: 2021-08-30

## 2021-08-30 DIAGNOSIS — U07.1 COVID-19 VIRUS INFECTION: Primary | ICD-10-CM

## 2021-08-30 PROCEDURE — 99213 OFFICE O/P EST LOW 20 MIN: CPT | Performed by: NURSE PRACTITIONER

## 2021-08-30 PROCEDURE — 1036F TOBACCO NON-USER: CPT | Performed by: NURSE PRACTITIONER

## 2021-08-30 NOTE — PROGRESS NOTES
COVID-19 Outpatient Progress Note    Assessment/Plan:    Problem List Items Addressed This Visit     None      Visit Diagnoses     COVID-19 virus infection    -  Primary         Disposition:     I recommended self-quarantine for 10 days and to watch for symptoms until 14 days after exposure  If patient were to develop symptoms, they should self isolate and call our office for further guidance  I have spent 13 minutes directly with the patient  Pt declines monoclonal antibody infusion  Advised f/u for any worsening symptoms  Quarantine through 9/5 based on symptom onset       Verification of patient location:    Patient is located in the following state in which I hold an active license [place]    Encounter provider [facility]ett, [facility]     Provider located at [address]  [facility] [address] [address]    Recent Visits  Date Type Provider Dept   08/27/21 Telephone Jerome recent visits within past 7 days and meeting all other requirements  Today's Visits  Date Type Provider Dept   08/30/21 Telemedicine Dalyin Osman 113   08/30/21 Telephone Governor Sara, [facility] today's visits and meeting all other requirements  Future Appointments  No visits were found meeting these conditions  Showing future appointments within next 150 days and meeting all other requirements     This virtual check-in was done via [employer] and patient was informed that this is a secure, HIPAA-compliant platform  He agrees to proceed  Patient agrees to participate in a virtual check in via telephone or video visit instead of presenting to the office to address urgent/immediate medical needs  Patient is aware this is a billable service  After connecting through [facility], the patient was identified by name and date of birth   Janet Trejo was informed that this was a telemedicine visit and that the exam was being conducted confidentially over secure lines  My office door was closed  No one else was in the room  Dariusz Ahmadi acknowledged consent and understanding of privacy and security of the telemedicine visit  I informed the patient that I have reviewed his record in Epic and presented the opportunity for him to ask any questions regarding the visit today  The patient agreed to participate  Subjective:   Dariusz Ahmadi is a 45 y o  male who is concerned about COVID-19  Patient's symptoms include fever (tmax 100), fatigue, sore throat, cough and myalgias  Patient denies chills, malaise, congestion, rhinorrhea, anosmia, loss of taste, shortness of breath, chest tightness, abdominal pain, nausea, vomiting, diarrhea and headaches       Date of symptom onset: 8/26/2021  COVID-19 vaccination status: Not vaccinated    Exposure:   Contact with a person who is under investigation (PUI) for or who is positive for COVID-19 within the last 14 days?: Yes    Hospitalized recently for fever and/or lower respiratory symptoms?: No      Currently a healthcare worker that is involved in direct patient care?: No      Works in a special setting where the risk of COVID-19 transmission may be high? (this may include long-term care, correctional and snf facilities; homeless shelters; assisted-living facilities and group homes ): No      Resident in a special setting where the risk of COVID-19 transmission may be high? (this may include long-term care, correctional and snf facilities; homeless shelters; assisted-living facilities and group homes ): No      Pt's wife and daughter tested positive for COVID 23  Pt has not been vaccinated  Reports mild symptoms, afebrile for the past 48 hours      Lab Results   Component Value Date    SARSCOV2 Positive (A) 08/27/2021     Past Medical History:   Diagnosis Date    H  pylori infection     Migraine headache     Pancreatitis 2012    Hospitalized for Pancreatitis    Reflux gastritis      Past Surgical History:   Procedure Laterality Date    APPENDECTOMY      ESOPHAGOGASTRODUODENOSCOPY N/A 2/29/2016    Procedure: ESOPHAGOGASTRODUODENOSCOPY (EGD); Surgeon: Best Tucker MD;  Location: [facility]; Service:      No current outpatient medications on file  No current facility-administered medications for this visit  No Known Allergies    Review of Systems   Constitutional: Positive for fatigue and fever (tmax 100)  Negative for chills  HENT: Positive for sore throat  Negative for congestion and rhinorrhea  Respiratory: Positive for cough  Negative for chest tightness and shortness of breath  Gastrointestinal: Negative for abdominal pain, diarrhea, nausea and vomiting  Musculoskeletal: Positive for myalgias  Neurological: Negative for headaches  Objective: There were no vitals filed for this visit  Physical Exam  Vitals and nursing note reviewed  Constitutional:       General: He is not in acute distress  Appearance: Normal appearance  He is well-developed  He is not diaphoretic  Eyes:      Conjunctiva/sclera: Conjunctivae normal    Pulmonary:      Effort: Pulmonary effort is normal  No respiratory distress  Neurological:      Mental Status: He is alert  Psychiatric:         Mood and Affect: Mood normal          Behavior: Behavior normal          VIRTUAL VISIT DISCLAIMER    Kayce Meneses verbally agrees to participate in [employer]  Pt is aware that [employer] could be limited without vital signs or the ability to perform a full hands-on physical Louvella Cones understands he or the provider may request at any time to terminate the video visit and request the patient to seek care or treatment in person

## 2021-09-02 ENCOUNTER — TELEMEDICINE (OUTPATIENT)
Dept: FAMILY MEDICINE CLINIC | Facility: CLINIC | Age: 51
End: 2021-09-02
Payer: COMMERCIAL

## 2021-09-02 ENCOUNTER — TELEPHONE (OUTPATIENT)
Dept: FAMILY MEDICINE CLINIC | Facility: CLINIC | Age: 51
End: 2021-09-02

## 2021-09-02 DIAGNOSIS — U07.1 COVID-19 VIRUS INFECTION: Primary | ICD-10-CM

## 2021-09-02 PROCEDURE — 99213 OFFICE O/P EST LOW 20 MIN: CPT | Performed by: NURSE PRACTITIONER

## 2021-09-02 RX ORDER — SODIUM CHLORIDE 9 MG/ML
20 INJECTION, SOLUTION INTRAVENOUS ONCE
Status: CANCELLED | OUTPATIENT
Start: 2021-09-04

## 2021-09-02 RX ORDER — ONDANSETRON 2 MG/ML
4 INJECTION INTRAMUSCULAR; INTRAVENOUS ONCE AS NEEDED
Status: CANCELLED | OUTPATIENT
Start: 2021-09-04

## 2021-09-02 RX ORDER — ACETAMINOPHEN 325 MG/1
650 TABLET ORAL ONCE AS NEEDED
Status: CANCELLED | OUTPATIENT
Start: 2021-09-04

## 2021-09-02 RX ORDER — ALBUTEROL SULFATE 90 UG/1
3 AEROSOL, METERED RESPIRATORY (INHALATION) ONCE AS NEEDED
Status: CANCELLED | OUTPATIENT
Start: 2021-09-04

## 2021-09-02 NOTE — TELEPHONE ENCOUNTER
Pt is covid positive and he said he was told he was eligible for a medication, he wants to try it now  Please call to talk about it

## 2021-09-02 NOTE — PROGRESS NOTES
COVID-19 Outpatient Progress Note    Assessment/Plan:    Problem List Items Addressed This Visit        Other    COVID-19 virus infection - Primary         Disposition:     I recommended continued isolation until at least 24 hours have passed since recovery defined as resolution of fever without the use of fever-reducing medications AND improvement in COVID symptoms AND 10 days have passed since onset of symptoms (or 10 days have passed since date of first positive viral diagnostic test for asymptomatic patients)  Patient is at increased risk of progressing towards severe COVID-19 due to the following high risk criteria:   - Obesity or being overweight    Patient meets criteria for Casirivimab/Imdevimab administration for the treatment of COVID-19  They were counseled in regards to risks, benefits, and side effects of this infusion  Casirivimab and imdevimab are investigational medicines used to treat mild to moderate symptoms of COVID-19 in non-hospitalized adults and adolescents (15years of age and older who weigh at least 80 pounds (40 kg)), and who are at high risk for developing severe COVID-19 symptoms or the need for hospitalization  Casirivimab and imdevimab are investigational because they are still being studied  There is limited information known about the safety and effectiveness of using casirivimab and imdevimab to treat people with COVID-19  The FDA has authorized the emergency use of casirivimab and imdevimab for the treatment of COVID-19 under an Emergency Use Authorization (EUA)  Possible side effects of casirivimab and imdevimab: Allergic reactions can happen during and after infusion with casirivimab and imdevimab  Possible reactions include: fever, chills, nausea, headache, shortness of breath, low blood pressure, wheezing, swelling of your lips, face, or throat, rash including hives, itching, muscle aches, and dizziness      The side effects of getting any medicine by vein may include brief pain, bleeding, bruising of the skin, soreness, swelling, and possible infection at the infusion site  These are not all the possible side effects of casirivimab and imdevimab  Not a lot of people have been given casirivimab and imdevimab  Serious and unexpected side effects may happen  Casirivimab and imdevimab are still being studied so it is possible that all of the risks are not known at this time  It is possible that casirivimab and imdevimab could interfere with your body's own ability to fight off a future infection of SARS-CoV-2  Similarly, casirivimab and imdevimab may reduce your body's immune response to a vaccine for SARS-CoV-2  Specific studies have not been conducted to address these possible risks  Emergency Use Authorization:    The [facility] FDA has made casirivimab and imdevimab available under an emergency access mechanism called an EUA  The EUA is supported by a [place] of [employer] and Human Service ([facility]) declaration that circumstances exist to justify the emergency use of drugs and biological products during the COVID-19 pandemic  Casirivimab and imdevimab have not undergone the same type of review as an FDA-approved or cleared product  The FDA may issue an EUA when certain criteria are met, which includes that there are no adequate, approved, available alternatives  In addition, the FDA decision is based on the totality of scientific evidence available showing that it is reasonable to believe that the product meets certain criteria for safety, performance, and labeling and may be effective in treatment of patients during the COVID-19 pandemic  All of these criteria must be met to allow for the product to be used in the treatment of patients during the COVID-19 pandemic       The EUA for casirivimab and imdevimab is in effect for the duration of the COVID-19 declaration justifying emergency use of these products, unless terminated or revoked (after which the products may no longer be used)  Regarding COVID-19 Vaccination:    Currently there is no data or safety or efficacy of COVID-19 vaccination in persons who received monoclonal antibodies as part of COVID-19 treatment  Treatment should be deferred for at least 90 days to avoid interference of the treatment with vaccine-induced immune responses (this is based on estimated half-life of therapies and evidence suggesting reinfection is uncommon within 90 days of initial infection)  The patient consents to proceed with casirivimab and imdevimab administration  I have spent 13 minutes directly with the patient  Appt scheduled for monoclonal antibody infusion 9/4 @ 930  EUA criteria reviewed  Will f/u with pt after infusion  Verification of patient location:    Patient is located in the following state in which I hold an active license [place]    Encounter provider Suraj Rodney, [address] [address]    Provider located at [address] [address]    Recent Visits  Date Type Provider Dept   08/30/21 Telemedicine Daylin Richard 113   08/30/21 Telephone Heather Suh 64   08/27/21 Telephone Jerome recent visits within past 7 days and meeting all other requirements  Today's Visits  Date Type Provider Dept   09/02/21 Telemedicine Daylin Richard 113   09/02/21 Telephone [place] today's visits and meeting all other requirements  Future Appointments  No visits were found meeting these conditions  Showing future appointments within next 150 days and meeting all other requirements     This virtual check-in was done via telephone and he agrees to proceed  Patient agrees to participate in a virtual check in via telephone or video visit instead of presenting to the office to address urgent/immediate medical needs  Patient is aware this is a billable service  After connecting through Telephone, the patient was identified by name and date of birth  Chadwick Johns was informed that this was a telemedicine visit and that the exam was being conducted confidentially over secure lines  My office door was closed  No one else was in the room  Chadwick Johns acknowledged consent and understanding of privacy and security of the telemedicine visit  I informed the patient that I have reviewed his record in Epic and presented the opportunity for him to ask any questions regarding the visit today  The patient agreed to participate  It was my intent to perform this visit via video technology but the patient was not able to do a video connection so the visit was completed via audio telephone only  Subjective:   Chadwick Johns is a 45 y o  male who has been screened for COVID-19  Patient's symptoms include fever, chills, cough and myalgias  Patient denies fatigue, malaise, congestion, rhinorrhea, sore throat, anosmia, loss of taste, shortness of breath, chest tightness, abdominal pain, nausea, vomiting, diarrhea and headaches  Date of symptom onset: 8/26/2021  Date of positive COVID-19 PCR: 8/27/2021  COVID-19 vaccination status: Not vaccinated    Breanna Lane has been staying home and has isolated themselves in his home  He is taking care to not share personal items and is cleaning all surfaces that are touched often, like counters, tabletops, and doorknobs using household cleaning sprays or wipes  He is wearing a mask when he leaves his room  Following up on COVID infection  He is now interested in monoclonal antibody infusion  He has persistent fevers, cough, and body aches  No SOB or breathing difficulties      Lab Results   Component Value Date    SARSCOV2 Positive (A) 08/27/2021     Past Medical History:   Diagnosis Date    H  pylori infection     Migraine headache     Pancreatitis 2012    Hospitalized for Pancreatitis  Reflux gastritis      Past Surgical History:   Procedure Laterality Date    APPENDECTOMY      ESOPHAGOGASTRODUODENOSCOPY N/A 2/29/2016    Procedure: ESOPHAGOGASTRODUODENOSCOPY (EGD); Surgeon: Loc Verma MD;  Location: [facility]; Service:      No current outpatient medications on file  No current facility-administered medications for this visit  No Known Allergies    Review of Systems   Constitutional: Positive for chills and fever  Negative for fatigue  HENT: Negative for congestion, rhinorrhea and sore throat  Respiratory: Positive for cough  Negative for chest tightness and shortness of breath  Gastrointestinal: Negative for abdominal pain, diarrhea, nausea and vomiting  Musculoskeletal: Positive for myalgias  Neurological: Negative for headaches  Objective: There were no vitals filed for this visit  Physical Exam    VIRTUAL VISIT DISCLAIMER    Karissa Chambers verbally agrees to participate in [employer]  Pt is aware that [employer] could be limited without vital signs or the ability to perform a full hands-on physical Vanessa Blew understands he or the provider may request at any time to terminate the video visit and request the patient to seek care or treatment in person

## 2021-09-04 ENCOUNTER — HOSPITAL ENCOUNTER (OUTPATIENT)
Dept: INFUSION CENTER | Facility: HOSPITAL | Age: 51
Discharge: HOME | End: 2021-09-04
Payer: COMMERCIAL

## 2021-09-04 VITALS
TEMPERATURE: 101.1 F | DIASTOLIC BLOOD PRESSURE: 86 MMHG | OXYGEN SATURATION: 96 % | HEART RATE: 89 BPM | SYSTOLIC BLOOD PRESSURE: 152 MMHG | RESPIRATION RATE: 18 BRPM

## 2021-09-04 DIAGNOSIS — U07.1 COVID-19 VIRUS INFECTION: Primary | ICD-10-CM

## 2021-09-04 PROCEDURE — M0243 CASIRIVI AND IMDEVI INFUSION: HCPCS | Performed by: FAMILY MEDICINE

## 2021-09-04 RX ORDER — ONDANSETRON 2 MG/ML
4 INJECTION INTRAMUSCULAR; INTRAVENOUS ONCE AS NEEDED
Status: DISCONTINUED | OUTPATIENT
Start: 2021-09-04 | End: 2021-09-07 | Stop reason: HOSPADM

## 2021-09-04 RX ORDER — ALBUTEROL SULFATE 90 UG/1
3 AEROSOL, METERED RESPIRATORY (INHALATION) ONCE AS NEEDED
Status: DISCONTINUED | OUTPATIENT
Start: 2021-09-04 | End: 2021-09-07 | Stop reason: HOSPADM

## 2021-09-04 RX ORDER — ONDANSETRON 2 MG/ML
4 INJECTION INTRAMUSCULAR; INTRAVENOUS ONCE AS NEEDED
Status: CANCELLED | OUTPATIENT
Start: 2021-09-04

## 2021-09-04 RX ORDER — ALBUTEROL SULFATE 90 UG/1
3 AEROSOL, METERED RESPIRATORY (INHALATION) ONCE AS NEEDED
Status: CANCELLED | OUTPATIENT
Start: 2021-09-04

## 2021-09-04 RX ORDER — SODIUM CHLORIDE 9 MG/ML
20 INJECTION, SOLUTION INTRAVENOUS ONCE
Status: CANCELLED | OUTPATIENT
Start: 2021-09-04

## 2021-09-04 RX ORDER — SODIUM CHLORIDE 9 MG/ML
20 INJECTION, SOLUTION INTRAVENOUS ONCE
Status: COMPLETED | OUTPATIENT
Start: 2021-09-04 | End: 2021-09-04

## 2021-09-04 RX ORDER — ACETAMINOPHEN 325 MG/1
650 TABLET ORAL ONCE AS NEEDED
Status: DISCONTINUED | OUTPATIENT
Start: 2021-09-04 | End: 2021-09-07 | Stop reason: HOSPADM

## 2021-09-04 RX ORDER — ACETAMINOPHEN 325 MG/1
650 TABLET ORAL ONCE AS NEEDED
Status: CANCELLED | OUTPATIENT
Start: 2021-09-04

## 2021-09-04 RX ADMIN — SODIUM CHLORIDE 20 ML/HR: 9 INJECTION, SOLUTION INTRAVENOUS at 09:41

## 2021-09-04 RX ADMIN — IMDEVIMAB: 1332 INJECTION, SOLUTION, CONCENTRATE INTRAVENOUS at 09:42

## 2021-09-04 RX ADMIN — ACETAMINOPHEN 650 MG: 325 TABLET, FILM COATED ORAL at 11:07

## 2021-09-04 NOTE — PLAN OF CARE
Problem: Potential for Falls  Goal: Patient will remain free of falls  Description: INTERVENTIONS:  - Educate patient/family on patient safety including physical limitations  - Instruct patient to call for assistance with activity   - Consult OT/PT to assist with strengthening/mobility   - Keep Call bell within reach  - Keep bed low and locked with side rails adjusted as appropriate  - Keep care items and personal belongings within reach  - Initiate and maintain comfort rounds  - Make Fall Risk Sign visible to staff  - Offer Toileting every  Hours, in advance of need  - Initiate/Maintain alarm  - Obtain necessary fall risk management equipment:   - Apply yellow socks and bracelet for high fall risk patients  - Consider moving patient to room near nurses station  9/4/2021 0937 by Avelino Harris, RN  Outcome: Progressing  9/4/2021 0930 by Avelino Harris, RN  Outcome: Progressing

## 2021-09-04 NOTE — PROGRESS NOTES
Patient tolerated Regeneron (Casirivimab and Imdevimab) infusion and 1 hour post observation without incident  IV discontinued, catheter intact  Gauze applied to site  Discharge instructions reviewed with patient verbally  Copy of discharge instructions given to patient  Patient verbalized understanding of all discharge instructions  Patient discharged without incident  **650 mg PO tylenol administered prior to discharge for temp of 101  1

## 2021-09-04 NOTE — PROGRESS NOTES
Infusion started  Patient advised to notify staff if new symptoms occur  Patient verbalized understanding of above

## 2021-09-24 ENCOUNTER — TELEPHONE (OUTPATIENT)
Dept: FAMILY MEDICINE CLINIC | Facility: CLINIC | Age: 51
End: 2021-09-24

## 2021-09-24 DIAGNOSIS — R05.9 COUGH: Primary | ICD-10-CM

## 2021-09-24 RX ORDER — BENZONATATE 200 MG/1
200 CAPSULE ORAL 3 TIMES DAILY PRN
Qty: 30 CAPSULE | Refills: 0 | Status: SHIPPED | OUTPATIENT
Start: 2021-09-24

## 2021-09-24 NOTE — TELEPHONE ENCOUNTER
[facility]    Patient had covid last month  Since then he has a dry cough  He is asking if there is something you can call in for that? Please call patient back

## 2021-10-21 ENCOUNTER — VBI (OUTPATIENT)
Dept: ADMINISTRATIVE | Facility: OTHER | Age: 51
End: 2021-10-21

## 2022-12-09 ENCOUNTER — TELEPHONE (OUTPATIENT)
Dept: FAMILY MEDICINE CLINIC | Facility: CLINIC | Age: 52
End: 2022-12-09

## 2022-12-09 NOTE — TELEPHONE ENCOUNTER
Steve Booth wife calling stating Fara Abrams tested positive for covid on Sunday  He is still having bad headaches, cough, fever (first 3 days)  Wife stated he wants the paxlovid  I'm leaving a message since we have no apts and paxlovid needs to be prescribed in the first 5 days    Please call patient back     Thank you

## 2022-12-09 NOTE — TELEPHONE ENCOUNTER
Spoke with patients wife  His symptoms started on Sunday and he took positive test on Wednesday at home  Wife was thinking he may be able to get Paxlovid but he is past time  If his health takes a turn for the worse, she will call office tomorrow first thing  She was not interested in bringing patient to [facility]    ELSIE Kaur

## 2022-12-15 ENCOUNTER — OFFICE VISIT (OUTPATIENT)
Dept: FAMILY MEDICINE CLINIC | Facility: CLINIC | Age: 52
End: 2022-12-15

## 2022-12-15 VITALS
DIASTOLIC BLOOD PRESSURE: 82 MMHG | RESPIRATION RATE: 17 BRPM | WEIGHT: 230.2 LBS | BODY MASS INDEX: 31.18 KG/M2 | HEART RATE: 78 BPM | SYSTOLIC BLOOD PRESSURE: 134 MMHG | TEMPERATURE: 97.3 F | HEIGHT: 72 IN

## 2022-12-15 DIAGNOSIS — R05.3 POST-COVID-19 SYNDROME MANIFESTING AS CHRONIC COUGH: Primary | ICD-10-CM

## 2022-12-15 DIAGNOSIS — U09.9 POST-COVID-19 SYNDROME MANIFESTING AS CHRONIC COUGH: Primary | ICD-10-CM

## 2022-12-15 RX ORDER — METHYLPREDNISOLONE 4 MG/1
TABLET ORAL
Qty: 21 TABLET | Refills: 0 | Status: SHIPPED | OUTPATIENT
Start: 2022-12-15

## 2022-12-15 NOTE — PROGRESS NOTES
Assessment/Plan:    1  Post-COVID-19 syndrome manifesting as chronic cough  Comments:  if no improvement after steroid pack then will take flonase and pepcid OTC and will follow back as needed  Orders:  -     methylPREDNISolone 4 MG tablet therapy pack; Use as directed on package            Patient Instructions: Take prednisone with food in morning and do not take any NSAID's while taking prednisone  Supportive care discussed and advised  Advised to RTO for any worsening and no improvement  Follow up for no improvement and worsening of conditions  Patient advised and educated when to see immediate medical care  Return if symptoms worsen or fail to improve  No future appointments  Subjective:      Patient ID: Yves Roman is a 47 y o  male  Chief Complaint   Patient presents with   • Cough     Was positive with at home test 2 weeks ago  Cough will not go away nm  lpn         Vitals:  /82   Pulse 78   Temp (!) 97 3 °F (36 3 °C)   Resp 17   Ht 6' (1 829 m)   Wt 104 kg (230 lb 3 2 oz)   BMI 31 22 kg/m²   Wt Readings from Last 3 Encounters:   12/15/22 104 kg (230 lb 3 2 oz)   07/06/21 104 kg (229 lb)   04/06/21 102 kg (225 lb)      HPI  Patient stated that had covid-19 about 2 weeks ago and feeling better except still having lingering cough and worse at night time  Denies fever, chills and sob  PHQ-2/9 Depression Screening    Little interest or pleasure in doing things: 0 - not at all  Feeling down, depressed, or hopeless: 0 - not at all  PHQ-2 Score: 0  PHQ-2 Interpretation: Negative depression screen             The following portions of the patient's history were reviewed and updated as appropriate: allergies, current medications, past family history, past medical history, past social history, past surgical history and problem list       Review of Systems   Constitutional: Negative for chills, diaphoresis, fatigue, fever and unexpected weight change     HENT: Negative for congestion, dental problem, drooling, ear discharge, ear pain, facial swelling, hearing loss, mouth sores, nosebleeds, postnasal drip, rhinorrhea, sinus pressure, sinus pain, sneezing, sore throat, tinnitus, trouble swallowing and voice change  Respiratory: Positive for cough  Negative for chest tightness, shortness of breath and wheezing  Cardiovascular: Negative  Gastrointestinal: Negative for abdominal pain, constipation, diarrhea, nausea and vomiting  Skin: Negative  Neurological: Negative for dizziness, light-headedness and headaches  Objective:    Social History     Tobacco Use   Smoking Status Never   Smokeless Tobacco Never       Allergies: No Known Allergies      Current Outpatient Medications   Medication Sig Dispense Refill   • methylPREDNISolone 4 MG tablet therapy pack Use as directed on package 21 tablet 0   • benzonatate (TESSALON) 200 MG capsule Take 1 capsule (200 mg total) by mouth 3 (three) times a day as needed for cough (Patient not taking: Reported on 12/15/2022) 30 capsule 0     No current facility-administered medications for this visit  Physical Exam  Constitutional:       Appearance: Normal appearance  HENT:      Head: Normocephalic  Nose: Nose normal       Mouth/Throat:      Pharynx: No pharyngeal swelling, oropharyngeal exudate or posterior oropharyngeal erythema  Eyes:      Conjunctiva/sclera: Conjunctivae normal    Cardiovascular:      Rate and Rhythm: Normal rate and regular rhythm  Heart sounds: Normal heart sounds  Pulmonary:      Effort: Pulmonary effort is normal       Breath sounds: Normal breath sounds  Musculoskeletal:         General: No swelling or tenderness  Normal range of motion  Skin:     General: Skin is warm and dry  Findings: No rash  Neurological:      Mental Status: He is alert and oriented to person, place, and time     Psychiatric:         Mood and Affect: Mood normal          Behavior: Behavior normal  Thought Content:  Thought content normal          Judgment: Judgment normal                      PATRIZIA Dukse

## 2022-12-15 NOTE — PATIENT INSTRUCTIONS
Methylprednisolone (By mouth)   Methylprednisolone (meth-il-pred-NIS-oh-lone)  Treats inflammation, severe allergies, flare-ups of ongoing illnesses, and many other medical problems  May also be used to decrease some symptoms of cancer  This medicine is a corticosteroid  Brand Name(s): Medrol, Medrol Dosepak, Methylpred-DP   There may be other brand names for this medicine  When This Medicine Should Not Be Used: This medicine is not right for everyone  Do not use it if you had an allergic reaction to methylprednisolone, or if you have a fungus infection  How to Use This Medicine:   Tablet  Take your medicine as directed  Your dose may need to be changed several times to find what works best for you  If you are using this medicine for an ongoing illness, your dose may need to be changed occasionally  Some people take this medicine only every other day, which helps to decrease side effects  Take your medicine in the morning, unless your doctor tells you otherwise  It is best to take this medicine with food or milk  Missed dose: Take a dose as soon as you remember  If it is almost time for your next dose, wait until then and take a regular dose  Do not take extra medicine to make up for a missed dose  Store the medicine in a closed container at room temperature, away from heat, moisture, and direct light  Drugs and Foods to Avoid:   Ask your doctor or pharmacist before using any other medicine, including over-the-counter medicines, vitamins, and herbal products  Some medicines can affect how methylprednisolone works  Tell your doctor if you are using any of the following:  Cyclosporine, ketoconazole, phenobarbital, phenytoin, rifampin, troleandomycin  Aspirin, especially in high doses  Blood thinner (including warfarin)  Diabetes medicine  This medicine may interfere with vaccines  Ask your doctor before you get a flu shot or any other vaccines    Warnings While Using This Medicine:   Tell your doctor if you are pregnant or breastfeeding, or if you have kidney disease, liver disease (including cirrhosis), adrenal gland problems, heart failure, high blood pressure, diabetes, osteoporosis, blood clotting problems, thyroid problems, mental health problems (including depression), myasthenia gravis, or stomach or bowel problems (including ulcer or diverticulitis)  Tell your doctor if you have an infection (including herpes eye infection, tuberculosis, or threadworm)  Also tell your doctor if you have a recent exposure to chickenpox or measles  This medicine may cause the following problems:  Increased risk of infection  Changes in mood or behavior  High blood pressure  Adrenal gland problems  Eye problems or changes in vision (including cataracts or glaucoma)  Bone problems (including osteoporosis)  Slow growth in children  Increased risk for cancer (including Kaposi's sarcoma)  If you use this medicine for a long time, tell your doctor about any extra stress or anxiety in your life, including other health concerns and emotional stress  Your dose might need to be changed for a short time while you have extra stress  Do not stop using this medicine suddenly  Your doctor will need to slowly decrease your dose before you stop it completely  Tell any doctor or dentist who treats you that you are using this medicine  This medicine may affect certain medical test results  Your doctor will do lab tests at regular visits to check on the effects of this medicine  Keep all appointments  Keep all medicine out of the reach of children  Never share your medicine with anyone  Possible Side Effects While Using This Medicine:   Call your doctor right away if you notice any of these side effects:   Allergic reaction: Itching or hives, swelling in your face or hands, swelling or tingling in your mouth or throat, chest tightness, trouble breathing  Bone pain, decrease in height  Dark freckles, skin color changes, coldness, weakness, tiredness, weight loss  Depression, unusual thoughts, feelings, or behaviors, trouble sleeping  Fever, chills, cough, sore throat, body aches  Severe stomach pain, nausea, vomiting, or red or black stools  Skin changes or growths  Swelling in your hands, ankles, or feet, rapid weight gain  Trouble seeing, blurred vision or other changes in vision, eye pain, headache  Unusual bleeding or bruising  If you notice these less serious side effects, talk with your doctor: Increased appetite  Round, puffy face  Weight gain around your neck, upper back, breast, face, or waist  If you notice other side effects that you think are caused by this medicine, tell your doctor  Call your doctor for medical advice about side effects  You may report side effects to FDA at 1-540-NVR-3168    © Copyright [employer] 2022 Information is for End User's use only and may not be sold, redistributed or otherwise used for commercial purposes  The above information is an  only  It is not intended as medical advice for individual conditions or treatments  Talk to your doctor, nurse or pharmacist before following any medical regimen to see if it is safe and effective for you  COVID-19 (Coronavirus Disease 2019)   AMBULATORY CARE:   What you need to know about COVID-19:  COVID-19 is the disease caused by a coronavirus first discovered in December 2019  Coronaviruses generally cause upper respiratory (nose, throat, and lung) infections, such as a cold  The 2019 virus spreads quickly and easily  It can be spread starting 2 to 3 days before symptoms even begin  What you need to know about variants: The virus has changed into several new forms (called variants) since it was discovered  The variants may be more contagious (easily spread) than the original form  Some may also cause more severe illness than others  Signs and symptoms of COVID-19  may not develop   Signs and symptoms usually start about 5 days after infection but can take 2 to 14 days  You may feel like you have the flu or a bad cold  Some signs and symptoms go away in a few days  Others can last weeks, months, or possibly years  You may have any of the following:  A cough    Shortness of breath or trouble breathing that may become severe    A fever    Chills that might include shaking    Muscle pain, body aches, or a headache    A sore throat    Sudden changes or loss of your taste or smell    Feeling mentally and physically tired (fatigue)    Congestion (stuffy head and nose), or a runny nose    Diarrhea, nausea, or vomiting    Call your local emergency number (911 in the [address] [facility]) if:   You have trouble breathing or shortness of breath at rest     You have chest pain or pressure that lasts longer than 5 minutes  You become confused or hard to wake  Your lips or face are blue  Seek care immediately if:   You have a fever of 104°F (40°C) or higher  Call your doctor if:   You have symptoms of COVID-19  You have questions or concerns about your condition or care  How COVID-19 is diagnosed:  Testing is offered at many sites  You may need to quarantine until you get your results  Any of the following tests may be used:  A viral PCR test  shows if you have a current infection  A sample is taken from your nose or throat with a swab  You may need to wait 1 or more days to get the test results  An antigen test  shows if you have a protein from the COVID-19 virus  This test is often called a rapid test because the results can be available in 30 minutes or less  An antibody test  shows if you had a recent or past infection  Blood samples are used for this test  Antibodies are made by your immune system to fight the virus that causes COVID-19  Antibodies form 1 to 3 weeks after you are infected  This test is not used to show if you are immune to the virus  A CT, MRI, ultrasound, or x-ray  may be used to check for complications of [id number]   These may include pneumonia, blood clots, or other complications  Treatment:   Mild symptoms  may get better on their own  Some treatments have emergency use authorization (EUA)  Examples include monoclonal antibodies and convalescent plasma  These may be given to help prevent worsening of your symptoms  You may also need any of the following:    Decongestants  help reduce nasal congestion and help you breathe more easily  If you take decongestant pills, they may make you feel restless or cause problems with your sleep  Do not use decongestant sprays for more than a few days  Cough suppressants  help reduce coughing  Ask your healthcare provider which type of cough medicine is best for you  To soothe a sore throat,  gargle with warm salt water, or use throat lozenges or a throat spray  Drink more liquids to thin and loosen mucus and to prevent dehydration  NSAIDs or acetaminophen  can help lower a fever and relieve body aches or a headache  Follow directions  If not taken correctly, NSAIDs can cause kidney damage and acetaminophen can cause liver damage  Severe or life-threatening symptoms  are treated in the hospital  You may need any of the following:     Medicines  may be given to fight the virus or treat inflammation  Blood thinners  help prevent or treat blood clots  If you have a deep vein thrombosis (DVT) or pulmonary embolism (PE), you may need to use blood thinners for at least 3 months  Extra oxygen  may be given if you have respiratory failure  This means your lungs cannot get enough oxygen into your blood and out to your organs  A ventilator  may be used to help you breathe  What you need to know about health problems the virus may cause: You may develop long-term health problems caused by the virus  Your risk is higher if you are 65 or older  A weak immune system, obesity, diabetes, chronic kidney disease, or a heart or lung condition can also increase your risk   Your risk is also higher if you are a current or former cigarette smoker  COVID-19 can lead to any of the following:  Multisymptom inflammatory syndrome in adults (MIS-A) or in children (MIS-C), causing inflammation in the heart, digestive system, skin, or brain    Shortness of breath, serious lower respiratory conditions, such as pneumonia or acute respiratory distress syndrome (ARDS)    Blood clots or blood vessel damage    Organ damage from a lack of oxygen or from blood clots    Sleep problems    Problems thinking clearly, remembering information, or concentrating    Mood changes, depression, or anxiety    Long-term problems tasting or smelling    Loss of appetite and weight loss    Nerve pain    Fatigue (feeling mentally and physically tired)    What you need to know about COVID-19 vaccines:  Healthcare providers recommend a COVID-19 vaccine, even if you have already had COVID-19  You are considered fully vaccinated against COVID-19 two weeks after the final dose of any COVID-19 vaccine  Let your healthcare provider know when you have received the final dose of the vaccine  Make a copy of your vaccination card  Keep the original with you in case you need to show it  Keep the copy in a safe place  COVID-19 vaccines are given as a shot in 1 or 2 doses  Vaccination is recommended for everyone 5 years or older  One 2-dose vaccine is fully approved  for those 16 years or older  This vaccine also has an emergency use authorization (EUA) for children 11to 13years old  No vaccine is currently available for children younger than 5 years  A booster (additional) dose  is given to help the immune system continue to protect against severe COVID-19  A booster is recommended for all adults 18 or older  The booster can be a different brand of the COVID-19 vaccine than you originally received  The timing for the booster depends on the type of vaccine you received:    1-dose vaccine:   The booster is given at least 2 months after you received the vaccine  2-dose vaccine: The booster is given at least 5 or 6 months after the second dose  A booster can be given to adolescents 15to 16years old  Only 1 COVID-19 vaccine has this EUA  The booster is given at least 5 months after the second dose of the original vaccine series  A booster is recommended for immunocompromised children 11to 6years old  Only 1 COVID-19 vaccine has this EUA  The booster is given 28 days after the second dose  Continue social distancing and other measures, even after you get the vaccine  Although it is not common, you can become infected after you get the vaccine  You may also be able to pass the virus to others without knowing you are infected  After you get the vaccine, check local, national, and international travel rules  You may need to be tested before you travel  Some countries require proof of a negative test before you travel  You may also need to quarantine after you return  Medicine may be given to prevent infection  The medicine can be given if you are at high risk for infection and cannot get the vaccine  It can also be given if your immune system does not respond well to the vaccine  How the 2019 coronavirus spreads:   Droplets are the main way all coronaviruses spread  The virus travels in droplets that form when a person talks, sings, coughs, or sneezes  The droplets can also float in the air for minutes or hours  Infection happens when you breathe in the droplets or get them in your eyes or nose  Close personal contact with an infected person increases your risk for infection  This means being within 6 feet (2 meters) of the person for at least 15 minutes over 24 hours  Person-to-person contact can spread the virus  For example, a person with the virus on his or her hands can spread it by shaking hands with someone  The virus can stay on objects and surfaces for up to 3 days    You may become infected by touching the object or surface and then touching your eyes or mouth  Help lower the risk for COVID-19:   Wash your hands often throughout the day  Use soap and water  Rub your soapy hands together, lacing your fingers, for at least 20 seconds  Rinse with warm, running water  Dry your hands with a clean towel or paper towel  Use hand  that contains alcohol if soap and water are not available  Teach children how to wash their hands and use hand   Cover sneezes and coughs  Turn your face away and cover your mouth and nose with a tissue  Throw the tissue away  Use the bend of your arm if a tissue is not available  Then wash your hands well with soap and water or use hand   Teach children how to cover a cough or sneeze  Wear a face covering (mask) when needed  Use a cloth covering with at least 2 layers  You can also create layers by putting a cloth covering over a disposable non-medical mask  Cover your mouth and your nose  Follow worldwide, national, and local social distancing guidelines  Keep at least 6 feet (2 meters) between you and others  Try not to touch your face  If you get the virus on your hands, you can transfer it to your eyes, nose, or mouth and become infected  You can also transfer it to objects, surfaces, or people  Clean and disinfect high-touch surfaces and objects often  Use disinfecting wipes, or make a solution of 4 teaspoons of bleach in 1 quart (4 cups) of water  Ask about other vaccines you may need  Get the influenza (flu) vaccine as soon as recommended each year, usually starting in September or October  Get the pneumonia vaccine if recommended  Your healthcare provider can tell you if you should also get other vaccines, and when to get them  Follow social distancing guidelines:  National and local social distancing rules vary  Rules and restrictions may change over time as restrictions are lifted   The following are general guidelines:  Stay home if you are sick or think you may have COVID-19  It is important to stay home if you are waiting for a testing appointment or for test results  Avoid close physical contact with anyone who does not live in your home  Do not shake hands with, hug, or kiss a person as a greeting  If you must use public transportation (such as a bus or subway), try to sit or stand away from others  Wear your face covering  Avoid in-person gatherings and crowds  Attend virtually if possible  Follow up with your doctor as directed:  Write down your questions so you remember to ask them during your visits  For more information:   [facility]  [address] , [address]  Phone: 3- 348 - [phone]  Web Address: [employer] br    © Copyright [address] 2022 Information is for End User's use only and may not be sold, redistributed or otherwise used for commercial purposes  All illustrations and images included in CareNotes® are the copyrighted property of [employer]  or [facility] Shavon Whitt   The above information is an  only  It is not intended as medical advice for individual conditions or treatments  Talk to your doctor, nurse or pharmacist before following any medical regimen to see if it is safe and effective for you

## 2024-08-08 ENCOUNTER — RA CDI HCC (OUTPATIENT)
Dept: OTHER | Facility: HOSPITAL | Age: 54
End: 2024-08-08

## 2024-08-08 NOTE — PROGRESS NOTES
HCC coding opportunities       Chart reviewed, no opportunity found: CHART REVIEWED, NO OPPORTUNITY FOUND        Patients Insurance        Commercial Insurance: [employer]

## 2024-08-14 ENCOUNTER — OFFICE VISIT (OUTPATIENT)
Dept: FAMILY MEDICINE CLINIC | Facility: CLINIC | Age: 54
End: 2024-08-14
Payer: COMMERCIAL

## 2024-08-14 VITALS
WEIGHT: 234 LBS | BODY MASS INDEX: 31.69 KG/M2 | HEART RATE: 64 BPM | SYSTOLIC BLOOD PRESSURE: 128 MMHG | RESPIRATION RATE: 18 BRPM | TEMPERATURE: 97.8 F | DIASTOLIC BLOOD PRESSURE: 80 MMHG | HEIGHT: 72 IN

## 2024-08-14 DIAGNOSIS — R42 LIGHTHEADEDNESS: ICD-10-CM

## 2024-08-14 DIAGNOSIS — Z00.00 WELL ADULT EXAM: Primary | ICD-10-CM

## 2024-08-14 DIAGNOSIS — Z13.6 SCREENING FOR CARDIOVASCULAR CONDITION: ICD-10-CM

## 2024-08-14 DIAGNOSIS — E78.00 ELEVATED LDL CHOLESTEROL LEVEL: ICD-10-CM

## 2024-08-14 DIAGNOSIS — Z12.5 SCREENING FOR PROSTATE CANCER: ICD-10-CM

## 2024-08-14 DIAGNOSIS — Z12.11 SCREEN FOR COLON CANCER: ICD-10-CM

## 2024-08-14 DIAGNOSIS — Z11.59 NEED FOR HEPATITIS C SCREENING TEST: ICD-10-CM

## 2024-08-14 DIAGNOSIS — R00.1 BRADYCARDIA: ICD-10-CM

## 2024-08-14 DIAGNOSIS — R91.1 PULMONARY NODULE, LEFT: ICD-10-CM

## 2024-08-14 DIAGNOSIS — R06.00 DYSPNEA, UNSPECIFIED TYPE: ICD-10-CM

## 2024-08-14 PROBLEM — U07.1 COVID-19 VIRUS INFECTION: Status: RESOLVED | Noted: 2021-09-02 | Resolved: 2024-08-14

## 2024-08-14 PROCEDURE — 99214 OFFICE O/P EST MOD 30 MIN: CPT | Performed by: FAMILY MEDICINE

## 2024-08-14 PROCEDURE — 93000 ELECTROCARDIOGRAM COMPLETE: CPT | Performed by: FAMILY MEDICINE

## 2024-08-14 PROCEDURE — 99396 PREV VISIT EST AGE 40-64: CPT | Performed by: FAMILY MEDICINE

## 2024-08-14 NOTE — PROGRESS NOTES
[facility] HEALTH MAINTENANCE OFFICE VISIT  [facility]    NAME: Chinmay Chun  AGE: 53 y.o. SEX: male  : 1970     DATE: 2024    Assessment and Plan     1. Well adult exam  -     CBC; Future  -     CBC  2. Elevated LDL cholesterol level  -     CBC; Future  -     CBC  3. Pulmonary nodule, left  -     CBC; Future  -     CBC  -     CT chest wo contrast; Future; Expected date: 2024  4. Screening for cardiovascular condition  -     CBC; Future  -     Comprehensive metabolic panel; Future  -     Lipid Panel with Direct LDL reflex; Future  -     CBC  -     Comprehensive metabolic panel  -     Lipid Panel with Direct LDL reflex  5. Screening for prostate cancer  -     CBC; Future  -     PSA, Total Screen; Future  -     CBC  6. Need for hepatitis C screening test  -     CBC; Future  -     Hepatitis C antibody; Future  -     CBC  -     Hepatitis C antibody  7. Screen for colon cancer  -     CBC; Future  -     CBC  -     Ambulatory referral to Gastroenterology; Future  8. Lightheadedness  Assessment & Plan:  One time episode a week ago  Orders:  -     POCT ECG  -     Echo complete w/ contrast if indicated; Future; Expected date: 2024  -     TSH, 3rd generation; Future  -     TSH, 3rd generation  9. Dyspnea, unspecified type  Assessment & Plan:  Mostly on exertion  Orders:  -     POCT ECG  -     Complete PFT without post bronchodilator; Future  -     Echo complete w/ contrast if indicated; Future; Expected date: 2024  -     TSH, 3rd generation; Future  -     TSH, 3rd generation  10. Bradycardia  -     TSH, 3rd generation; Future  -     TSH, 3rd generation    EKG - Sinus Tyrese, no ischemia    Patient Counseling:   Nutrition: Stressed importance of a well balanced diet, moderation of sodium/saturated fat, caloric balance and sufficient intake of fiber  Exercise: Stressed the importance of regular exercise with a goal of 150 minutes per week  Dental  Health: Discussed daily flossing and brushing and regular dental visits     Immunizations reviewed: Up To Date  Discussed benefits of:  Colon Cancer Screening, Prostate Cancer Screening , and Screening labs.  BMI Counseling: Body mass index is 31.74 kg/m². Discussed with patient's BMI with him. The BMI is above normal. Nutrition recommendations include reducing portion sizes.    Return in about 1 year (around 8/14/2025).        Chief Complaint     Chief Complaint   Patient presents with   • Physical Exam     Sas/cma       History of Present Illness     Pt is sched for a full physical    Pt states while he is here - about a week ago he was in the mall with his wife looking at shoes and states he felt dizzy.  States he sat down for 15 minuts - after that it went away  No LOC  Never had that problem prior or since. Symptom was just in his head  Almost feeling out of it.    Pt states he also states he runs out of breath sometimes.  States this has been a problem for over a year  States in the past he used to [employer] for [employer].  Lately he has felt more tired        Well Adult Physical   Patient here for a comprehensive physical exam.      Diet and Physical Activity  Diet: well balanced diet  Exercise:  physical work       Depression Screen  PHQ-2/9 Depression Screening    Little interest or pleasure in doing things: 0 - not at all  Feeling down, depressed, or hopeless: 0 - not at all  PHQ-2 Score: 0  PHQ-2 Interpretation: Negative depression screen          General Health  Hearing: Normal:  bilateral  Vision: no vision problems  Dental: regular dental visits    Reproductive Health  No issues       The following portions of the patient's history were reviewed and updated as appropriate: allergies, current medications, past family history, past medical history, past social history, past surgical history and problem list.    Review of Systems     Review of Systems   Constitutional:  Negative for activity change,  appetite change, chills, diaphoresis, fatigue, fever and unexpected weight change.   HENT:  Negative for congestion, dental problem, ear pain, mouth sores, sinus pressure, sinus pain, sore throat and trouble swallowing.    Eyes:  Negative for photophobia, discharge and itching.   Respiratory:  Positive for shortness of breath. Negative for apnea and chest tightness.    Cardiovascular:  Negative for chest pain, palpitations and leg swelling.   Gastrointestinal:  Negative for abdominal distention, abdominal pain, blood in stool, nausea and vomiting.   Endocrine: Negative for cold intolerance, heat intolerance, polydipsia, polyphagia and polyuria.   Genitourinary:  Negative for difficulty urinating.   Musculoskeletal:  Negative for arthralgias.   Skin:  Negative for color change and wound.   Neurological:  Positive for light-headedness. Negative for dizziness, syncope, speech difficulty and headaches.   Hematological:  Negative for adenopathy.   Psychiatric/Behavioral:  Negative for agitation and behavioral problems.        Past Medical History     Past Medical History:   Diagnosis Date   • COVID-19 virus infection 09/02/2021   • H. pylori infection    • Migraine headache    • Pancreatitis 2012    Hospitalized for Pancreatitis   • Reflux gastritis        Past Surgical History     Past Surgical History:   Procedure Laterality Date   • APPENDECTOMY     • ESOPHAGOGASTRODUODENOSCOPY N/A 2/29/2016    Procedure: ESOPHAGOGASTRODUODENOSCOPY (EGD);  Surgeon: Nate Sanders MD;  Location: [facility] GI LAB;  Service:        Social History     Social History     Socioeconomic History   • Marital status: /Civil Union     Spouse name: None   • Number of children: None   • Years of education: None   • Highest education level: None   Occupational History   • None   Tobacco Use   • Smoking status: Never     Passive exposure: Never   • Smokeless tobacco: Never   Vaping Use   • Vaping status: Never Used   Substance and Sexual Activity    • Alcohol use: Yes     Comment: Occasionally   • Drug use: No   • Sexual activity: None   Other Topics Concern   • None   Social History Narrative   • None     Social Determinants of Health     Financial Resource Strain: Not on file   Food Insecurity: Not on file   Transportation Needs: Not on file   Physical Activity: Not on file   Stress: Not on file   Social Connections: Not on file   Intimate Partner Violence: Not on file   Housing Stability: Not on file       Family History     Family History   Problem Relation Age of Onset   • Brain cancer Mother    • Stroke Mother    • Dementia Father    • Mental illness Neg Hx        Current Medications     No current outpatient medications on file.     Allergies     No Known Allergies    Objective     /80   Pulse 64   Temp 97.8 °F (36.6 °C)   Resp 18   Ht 6' (1.829 m)   Wt 106 kg (234 lb)   BMI 31.74 kg/m²      Physical Exam  Vitals and nursing note reviewed.   Constitutional:       General: He is not in acute distress.     Appearance: He is well-developed. He is not diaphoretic.   HENT:      Head: Normocephalic and atraumatic.      Right Ear: External ear normal.      Left Ear: External ear normal.      Nose: Nose normal.      Mouth/Throat:      Pharynx: No oropharyngeal exudate.   Eyes:      General: No scleral icterus.        Right eye: No discharge.         Left eye: No discharge.      Pupils: Pupils are equal, round, and reactive to light.   Neck:      Thyroid: No thyromegaly.   Cardiovascular:      Rate and Rhythm: Normal rate.      Heart sounds: Normal heart sounds. No murmur heard.  Pulmonary:      Effort: Pulmonary effort is normal. No respiratory distress.      Breath sounds: Normal breath sounds. No wheezing.   Abdominal:      General: Bowel sounds are normal. There is no distension.      Palpations: Abdomen is soft. There is no mass.      Tenderness: There is no abdominal tenderness. There is no guarding or rebound.   Genitourinary:     Testes:  Normal.      Prostate: Normal.   Musculoskeletal:         General: Normal range of motion.   Skin:     General: Skin is warm and dry.      Findings: No erythema or rash.   Neurological:      Mental Status: He is alert.      Coordination: Coordination normal.      Deep Tendon Reflexes: Reflexes normal.   Psychiatric:         Behavior: Behavior normal.           Vision Screening    Right eye Left eye Both eyes   Without correction 20/50 20/30 20/25   With correction              Gwyn Lombardi, DO [facility]

## 2024-08-28 ENCOUNTER — TELEPHONE (OUTPATIENT)
Dept: FAMILY MEDICINE CLINIC | Facility: CLINIC | Age: 54
End: 2024-08-28

## 2024-08-28 DIAGNOSIS — E78.2 MIXED HYPERLIPIDEMIA: Primary | ICD-10-CM

## 2024-08-28 LAB
ALBUMIN SERPL-MCNC: 4.7 G/DL (ref 3.8–4.9)
ALP SERPL-CCNC: 96 IU/L (ref 44–121)
ALT SERPL-CCNC: 43 IU/L (ref 0–44)
AST SERPL-CCNC: 32 IU/L (ref 0–40)
BILIRUB SERPL-MCNC: 0.5 MG/DL (ref 0–1.2)
BUN SERPL-MCNC: 13 MG/DL (ref 6–24)
BUN/CREAT SERPL: 14 (ref 9–20)
CALCIUM SERPL-MCNC: 9.7 MG/DL (ref 8.7–10.2)
CHLORIDE SERPL-SCNC: 101 MMOL/L (ref 96–106)
CHOLEST SERPL-MCNC: 205 MG/DL (ref 100–199)
CO2 SERPL-SCNC: 22 MMOL/L (ref 20–29)
CREAT SERPL-MCNC: 0.94 MG/DL (ref 0.76–1.27)
EGFR: 96 ML/MIN/1.73
ERYTHROCYTE [DISTWIDTH] IN BLOOD BY AUTOMATED COUNT: 12.8 % (ref 11.6–15.4)
GLOBULIN SER-MCNC: 2.8 G/DL (ref 1.5–4.5)
GLUCOSE SERPL-MCNC: 98 MG/DL (ref 70–99)
HCT VFR BLD AUTO: 46.4 % (ref 37.5–51)
HCV AB S/CO SERPL IA: NON REACTIVE
HDLC SERPL-MCNC: 40 MG/DL
HGB BLD-MCNC: 15.4 G/DL (ref 13–17.7)
LDLC SERPL CALC-MCNC: 141 MG/DL (ref 0–99)
LDLC/HDLC SERPL: 3.5 RATIO (ref 0–3.6)
MCH RBC QN AUTO: 29.2 PG (ref 26.6–33)
MCHC RBC AUTO-ENTMCNC: 33.2 G/DL (ref 31.5–35.7)
MCV RBC AUTO: 88 FL (ref 79–97)
MICRODELETION SYND BLD/T FISH: NORMAL
PLATELET # BLD AUTO: 255 X10E3/UL (ref 150–450)
POTASSIUM SERPL-SCNC: 4.6 MMOL/L (ref 3.5–5.2)
PROT SERPL-MCNC: 7.5 G/DL (ref 6–8.5)
RBC # BLD AUTO: 5.27 X10E6/UL (ref 4.14–5.8)
SL AMB VLDL CHOLESTEROL CALC: 24 MG/DL (ref 5–40)
SODIUM SERPL-SCNC: 139 MMOL/L (ref 134–144)
TRIGL SERPL-MCNC: 135 MG/DL (ref 0–149)
TSH SERPL DL<=0.005 MIU/L-ACNC: 2.17 UIU/ML (ref 0.45–4.5)
WBC # BLD AUTO: 5 X10E3/UL (ref 3.4–10.8)

## 2024-08-28 NOTE — TELEPHONE ENCOUNTER
Please call pt, lipids are mildly elevated.  I would suggest a low fat low cholesterol diet and redraw the numbers in 6 months.  If they remain elevated we should start a low dose statin  6 month labs ordered

## 2025-04-01 ENCOUNTER — OFFICE VISIT (OUTPATIENT)
Dept: URGENT CARE | Facility: CLINIC | Age: 55
End: 2025-04-01
Payer: COMMERCIAL

## 2025-04-01 VITALS
WEIGHT: 235 LBS | SYSTOLIC BLOOD PRESSURE: 148 MMHG | HEIGHT: 72 IN | DIASTOLIC BLOOD PRESSURE: 90 MMHG | TEMPERATURE: 99.1 F | OXYGEN SATURATION: 96 % | BODY MASS INDEX: 31.83 KG/M2 | RESPIRATION RATE: 18 BRPM | HEART RATE: 96 BPM

## 2025-04-01 DIAGNOSIS — R05.1 ACUTE COUGH: Primary | ICD-10-CM

## 2025-04-01 PROCEDURE — 99213 OFFICE O/P EST LOW 20 MIN: CPT | Performed by: FAMILY MEDICINE

## 2025-04-01 RX ORDER — BENZONATATE 200 MG/1
200 CAPSULE ORAL 3 TIMES DAILY PRN
Qty: 20 CAPSULE | Refills: 0 | Status: SHIPPED | OUTPATIENT
Start: 2025-04-01

## 2025-04-01 RX ORDER — AZITHROMYCIN 250 MG/1
TABLET, FILM COATED ORAL
Qty: 6 TABLET | Refills: 0 | Status: SHIPPED | OUTPATIENT
Start: 2025-04-01 | End: 2025-04-05

## 2025-04-01 NOTE — PROGRESS NOTES
[facility]        NAME: Chinmay Chun is a 54 y.o. male  : 1970    MRN: [id number]  DATE: 2025  TIME: 4:15 PM    Assessment and Plan   Acute cough [R05.1]  1. Acute cough          Likely experiencing a viral URI with postnasal drip.  Unable to rule out a bacterial process.  Will therefore treat with azithromycin due to its anti-inflammatory properties.  Tessalon Perles as needed for cough suppression.  Patient advised on Flonase use to counteract postnasal drip.  Recommend hydrating plenty fluids and to gargle with warm salt water.    Patient Instructions     Follow up with PCP in 3-5 days.  Proceed to  ER if symptoms worsen.    If tests have been performed at [facility], our office will contact you with results if changes need to be made to the care plan discussed with you at the visit.  [facility]    Chief Complaint     Chief Complaint   Patient presents with    Cough     Cough for a week. Taking nyquil         History of Present Illness       54-year-old male presents today with coughing which has persisted for over a week associated with low-grade fevers, fatigue, occasional nasal congestion, postnasal drip, sinus pressure, occasional dyspnea, headaches and decreased concentration.  Is concerned because he experienced a similar situation earlier this year where it persisted for about a month.  Has a flight coming up and a little over 2 weeks.    Cough  Associated symptoms include a fever, headaches, postnasal drip and shortness of breath. Pertinent negatives include no chest pain or chills.       Review of Systems   Review of Systems   Constitutional:  Positive for fatigue and fever. Negative for chills.   HENT:  Positive for congestion, postnasal drip and sinus pressure.    Respiratory:  Positive for cough and shortness of breath.    Cardiovascular:  Negative for chest pain.   Gastrointestinal:  Negative for abdominal pain and nausea.    Neurological:  Positive for headaches. Negative for dizziness.   Psychiatric/Behavioral:  Positive for decreased concentration.      Current Medications     No current outpatient medications on file.    Current Allergies     Allergies as of 04/01/2025    (No Known Allergies)            The following portions of the patient's history were reviewed and updated as appropriate: allergies, current medications, past family history, past medical history, past social history, past surgical history and problem list.     Past Medical History:   Diagnosis Date    COVID-19 virus infection 09/02/2021    H. pylori infection     Migraine headache     Pancreatitis 2012    Hospitalized for Pancreatitis    Reflux gastritis        Past Surgical History:   Procedure Laterality Date    APPENDECTOMY      ESOPHAGOGASTRODUODENOSCOPY N/A 2/29/2016    Procedure: ESOPHAGOGASTRODUODENOSCOPY (EGD);  Surgeon: Nate Sanders MD;  Location: [facility] GI LAB;  Service:        Family History   Problem Relation Age of Onset    Brain cancer Mother     Stroke Mother     Dementia Father     Mental illness Neg Hx          Medications have been verified.        Objective   /90   Pulse 96   Temp 99.1 °F (37.3 °C)   Resp 18   Ht 6' (1.829 m)   Wt 107 kg (235 lb)   SpO2 96%   BMI 31.87 kg/m²   No LMP for male patient.       Physical Exam     Physical Exam  Vitals and nursing note reviewed.   Constitutional:       General: He is in acute distress (Cough).      Appearance: Normal appearance. He is normal weight. He is not ill-appearing, toxic-appearing or diaphoretic.   HENT:      Head: Normocephalic and atraumatic.      Nose: Congestion and rhinorrhea present.      Comments: Inflamed nasal mucosa     Mouth/Throat:      Mouth: Mucous membranes are moist.      Pharynx: No posterior oropharyngeal erythema.   Eyes:      General:         Right eye: No discharge.         Left eye: No discharge.      Conjunctiva/sclera: Conjunctivae normal.    Cardiovascular:      Rate and Rhythm: Normal rate and regular rhythm.   Pulmonary:      Effort: Pulmonary effort is normal. No respiratory distress.      Breath sounds: Normal breath sounds. No wheezing, rhonchi or rales.   Skin:     General: Skin is warm.      Findings: No erythema.   Neurological:      General: No focal deficit present.      Mental Status: He is alert and oriented to person, place, and time.   Psychiatric:         Mood and Affect: Mood normal.         Behavior: Behavior normal.         Thought Content: Thought content normal.         Judgment: Judgment normal.